# Patient Record
Sex: FEMALE | Race: WHITE | NOT HISPANIC OR LATINO | Employment: FULL TIME | URBAN - METROPOLITAN AREA
[De-identification: names, ages, dates, MRNs, and addresses within clinical notes are randomized per-mention and may not be internally consistent; named-entity substitution may affect disease eponyms.]

---

## 2017-04-05 ENCOUNTER — TRANSFERRED RECORDS (OUTPATIENT)
Dept: MULTI SPECIALTY CLINIC | Facility: CLINIC | Age: 47
End: 2017-04-05

## 2017-06-24 ENCOUNTER — HEALTH MAINTENANCE LETTER (OUTPATIENT)
Age: 47
End: 2017-06-24

## 2019-10-02 ENCOUNTER — HEALTH MAINTENANCE LETTER (OUTPATIENT)
Age: 49
End: 2019-10-02

## 2021-01-15 ENCOUNTER — HEALTH MAINTENANCE LETTER (OUTPATIENT)
Age: 51
End: 2021-01-15

## 2021-09-05 ENCOUNTER — HEALTH MAINTENANCE LETTER (OUTPATIENT)
Age: 51
End: 2021-09-05

## 2022-02-08 ENCOUNTER — OFFICE VISIT (OUTPATIENT)
Dept: FAMILY MEDICINE | Facility: CLINIC | Age: 52
End: 2022-02-08
Payer: COMMERCIAL

## 2022-02-08 VITALS
OXYGEN SATURATION: 96 % | HEART RATE: 91 BPM | SYSTOLIC BLOOD PRESSURE: 110 MMHG | TEMPERATURE: 98.4 F | WEIGHT: 127 LBS | DIASTOLIC BLOOD PRESSURE: 64 MMHG

## 2022-02-08 DIAGNOSIS — J32.9 SINUSITIS, UNSPECIFIED CHRONICITY, UNSPECIFIED LOCATION: Primary | ICD-10-CM

## 2022-02-08 PROCEDURE — 99203 OFFICE O/P NEW LOW 30 MIN: CPT | Performed by: NURSE PRACTITIONER

## 2022-02-08 PROCEDURE — 99000 SPECIMEN HANDLING OFFICE-LAB: CPT | Performed by: NURSE PRACTITIONER

## 2022-02-08 PROCEDURE — U0003 INFECTIOUS AGENT DETECTION BY NUCLEIC ACID (DNA OR RNA); SEVERE ACUTE RESPIRATORY SYNDROME CORONAVIRUS 2 (SARS-COV-2) (CORONAVIRUS DISEASE [COVID-19]), AMPLIFIED PROBE TECHNIQUE, MAKING USE OF HIGH THROUGHPUT TECHNOLOGIES AS DESCRIBED BY CMS-2020-01-R: HCPCS | Mod: 90 | Performed by: NURSE PRACTITIONER

## 2022-02-08 PROCEDURE — U0005 INFEC AGEN DETEC AMPLI PROBE: HCPCS | Mod: 90 | Performed by: NURSE PRACTITIONER

## 2022-02-08 RX ORDER — LEVOTHYROXINE SODIUM 100 UG/1
100 TABLET ORAL DAILY
COMMUNITY
End: 2022-11-16

## 2022-02-08 NOTE — PROGRESS NOTES
Assessment & Plan     Sinusitis, unspecified chronicity, unspecified location  Nasal congestion and sinus fullness for 10 days.  We will treat empirically with Augmentin.  She had a negative home rapid antigen test about 5 days ago but we will test her with PCR while she is here  - amoxicillin-clavulanate (AUGMENTIN) 875-125 MG tablet; Take 1 tablet by mouth 2 times daily  - Symptomatic; Unknown COVID-19 Virus (Coronavirus) by PCR Nose    Patient Instructions   Augmentin antibiotic 1 tablet twice daily for 7 days.    Covid testing today.      Return in about 3 months (around 5/8/2022) for Follow up.    Robbie Schreiber, [facility]    Justo Wilson is a 51 year old who presents for the following health issues     HPI     Sinus fullness along with some pressure.  Symptom started about 10 days ago.  No fevers.  No respiratory distress.  Mild cough.  She appears to be vaccinated for COVID-19      Review of Systems   Constitutional, HEENT, cardiovascular, pulmonary, gi and gu systems are negative, except as otherwise noted.      Objective    /64 (BP Location: Right arm, Patient Position: Sitting, Cuff Size: Adult Regular)   Pulse 91   Temp 98.4  F (36.9  C) (Oral)   Wt 57.6 kg (127 lb)   SpO2 96%   There is no height or weight on file to calculate BMI.  Physical Exam     Patient is alert oriented and in no acute distress.

## 2022-02-09 LAB — SARS-COV-2 RNA RESP QL NAA+PROBE: NOT DETECTED

## 2022-02-19 ENCOUNTER — HEALTH MAINTENANCE LETTER (OUTPATIENT)
Age: 52
End: 2022-02-19

## 2022-05-17 ENCOUNTER — NURSE TRIAGE (OUTPATIENT)
Dept: NURSING | Facility: CLINIC | Age: 52
End: 2022-05-17
Payer: COMMERCIAL

## 2022-05-17 NOTE — TELEPHONE ENCOUNTER
Patient calling with symptoms: sneezing, coughing, sore throat, sinus headache, body aches.     Symptoms started yesterday  No fever, no shortness of breath.    2 negative at-home covid test, but was exposed to Covid by child she greg on Friday and Saturday.    Patient does not have a PCP with [place]. Advised virtual visit and Covid PCR test. Patient declined at this time and will go to urgent care if symptoms worsen. Care advice given and advised patient call back with any questions or concerns. Patient verbalized understanding and agrees with plan.    Latanya Rea RN  05/17/22 3:59 PM  [facility] Nurse Advisor        Reason for Disposition    [1] COVID-19 infection suspected by caller or triager AND [2] mild symptoms (cough, fever, or others) AND [3] negative COVID-19 rapid test    Additional Information    Negative: SEVERE difficulty breathing (e.g., struggling for each breath, speaks in single words)    Negative: Difficult to awaken or acting confused (e.g., disoriented, slurred speech)    Negative: Bluish (or gray) lips or face now    Negative: Shock suspected (e.g., cold/pale/clammy skin, too weak to stand, low BP, rapid pulse)    Negative: Sounds like a life-threatening emergency to the triager    Negative: [1] Diagnosed or suspected COVID-19 AND [2] symptoms lasting 3 or more weeks    Negative: [1] COVID-19 exposure AND [2] no symptoms    Negative: COVID-19 vaccine reaction suspected (e.g., fever, headache, muscle aches) occurring 1 to 3 days after getting vaccine    Negative: COVID-19 vaccine, questions about    Negative: [1] Lives with someone known to have influenza (flu test positive) AND [2] flu-like symptoms (e.g., cough, runny nose, sore throat, SOB; with or without fever)    Negative: [1] Adult with possible COVID-19 symptoms AND [2] triager concerned about severity of symptoms or other causes    Negative: COVID-19 and breastfeeding, questions about    Negative: SEVERE or constant chest  pain or pressure  (Exception: Mild central chest pain, present only when coughing.)    Negative: Headache and stiff neck (can't touch chin to chest)    Negative: MODERATE difficulty breathing (e.g., speaks in phrases, SOB even at rest, pulse 100-120)    Negative: Oxygen level (e.g., pulse oximetry) 90 percent or lower    Negative: Chest pain or pressure    Negative: Patient sounds very sick or weak to the triager    Negative: MILD difficulty breathing (e.g., minimal/no SOB at rest, SOB with walking, pulse <100)    Negative: Fever > 103 F (39.4 C)    Negative: [1] Fever > 101 F (38.3 C) AND [2] over 60 years of age    Negative: [1] Fever > 100.0 F (37.8 C) AND [2] bedridden (e.g., nursing home patient, CVA, chronic illness, recovering from surgery)    Negative: HIGH RISK for severe COVID complications (e.g., weak immune system, age > 64 years, obesity with BMI > 25, pregnant, chronic lung disease or other chronic medical condition) (Exception: Already seen by PCP and no new or worsening symptoms.)    Negative: [1] HIGH RISK patient AND [2] influenza is widespread in the community AND [3] ONE OR MORE respiratory symptoms: cough, sore throat, runny or stuffy nose    Negative: [1] HIGH RISK patient AND [2] influenza exposure within the last 7 days AND [3] ONE OR MORE respiratory symptoms: cough, sore throat, runny or stuffy nose    Negative: Oxygen level (e.g., pulse oximetry) 91 to 94 percent    Protocols used: CORONAVIRUS (COVID-19) DIAGNOSED OR [id number] 1.18.2022    COVID 19 Nurse Triage Plan/Patient Instructions    Please be aware that novel coronavirus (COVID-19) may be circulating in the community. If you develop symptoms such as fever, cough, or SOB or if you have concerns about the presence of another infection including coronavirus (COVID-19), please contact your health care provider or visit [email or web address].     Disposition/Instructions    Virtual Visit with provider recommended. Reference  Visit Selection Guide.    Thank you for taking steps to prevent the spread of this virus.  o Limit your contact with others.  o Wear a simple mask to cover your cough.  o Wash your hands well and often.    Resources    [facility]: About COVID-19: [email or web address]    CDC: What to Do If You're Sick: www.cdc.gov/coronavirus/2019-ncov/about/steps-when-sick.html    CDC: Ending Home Isolation: www.cdc.gov/coronavirus/2019-ncov/hcp/disposition-in-home-patients.html     CDC: Caring for Someone: www.cdc.gov/coronavirus/2019-ncov/if-you-are-sick/care-for-someone.html     [facility]: Interim Guidance for Hospital Discharge to Home: [email or web address]/[email or web address]/hcp/hospdischarge.pdf    [facility] clinical trials (COVID-19 research studies): [email or web address].[facility]-clinical-trials     Below are the COVID-19 hotlines at the [place] Department of [facility] ([facility]). Interpreters are available.   o For health questions: Call 826-638-2628 or 1-320.795.5900 (7 a.m. to 7 p.m.)  o For questions about schools and childcare: Call 163-195-6488 or 1-991.287.5427 (7 a.m. to 7 p.m.)

## 2022-05-18 ENCOUNTER — VIRTUAL VISIT (OUTPATIENT)
Dept: FAMILY MEDICINE | Facility: CLINIC | Age: 52
End: 2022-05-18
Payer: COMMERCIAL

## 2022-05-18 ENCOUNTER — NURSE TRIAGE (OUTPATIENT)
Dept: NURSING | Facility: CLINIC | Age: 52
End: 2022-05-18

## 2022-05-18 ENCOUNTER — LAB (OUTPATIENT)
Dept: FAMILY MEDICINE | Facility: CLINIC | Age: 52
End: 2022-05-18
Attending: PHYSICIAN ASSISTANT

## 2022-05-18 DIAGNOSIS — J06.9 UPPER RESPIRATORY TRACT INFECTION, UNSPECIFIED TYPE: ICD-10-CM

## 2022-05-18 DIAGNOSIS — J06.9 UPPER RESPIRATORY TRACT INFECTION, UNSPECIFIED TYPE: Primary | ICD-10-CM

## 2022-05-18 LAB
FLUAV AG SPEC QL IA: NEGATIVE
FLUBV AG SPEC QL IA: NEGATIVE
SARS-COV-2 RNA RESP QL NAA+PROBE: POSITIVE

## 2022-05-18 PROCEDURE — U0005 INFEC AGEN DETEC AMPLI PROBE: HCPCS

## 2022-05-18 PROCEDURE — 99213 OFFICE O/P EST LOW 20 MIN: CPT | Mod: CS | Performed by: PHYSICIAN ASSISTANT

## 2022-05-18 PROCEDURE — 87804 INFLUENZA ASSAY W/OPTIC: CPT

## 2022-05-18 PROCEDURE — 87804 INFLUENZA ASSAY W/OPTIC: CPT | Mod: 59

## 2022-05-18 PROCEDURE — U0003 INFECTIOUS AGENT DETECTION BY NUCLEIC ACID (DNA OR RNA); SEVERE ACUTE RESPIRATORY SYNDROME CORONAVIRUS 2 (SARS-COV-2) (CORONAVIRUS DISEASE [COVID-19]), AMPLIFIED PROBE TECHNIQUE, MAKING USE OF HIGH THROUGHPUT TECHNOLOGIES AS DESCRIBED BY CMS-2020-01-R: HCPCS

## 2022-05-18 RX ORDER — PSEUDOEPHEDRINE HCL 120 MG/1
120 TABLET, FILM COATED, EXTENDED RELEASE ORAL EVERY 12 HOURS
Qty: 24 TABLET | Refills: 0 | Status: SHIPPED | OUTPATIENT
Start: 2022-05-18 | End: 2022-09-25

## 2022-05-18 RX ORDER — GUAIFENESIN AND DEXTROMETHORPHAN HYDROBROMIDE 1200; 60 MG/1; MG/1
1 TABLET, EXTENDED RELEASE ORAL 2 TIMES DAILY
Qty: 28 TABLET | Refills: 0 | Status: SHIPPED | OUTPATIENT
Start: 2022-05-18 | End: 2022-09-25

## 2022-05-18 NOTE — PATIENT INSTRUCTIONS
At [facility], we strive to deliver an exceptional experience to you, every time we see you. If you receive a survey, please complete it as we do value your feedback.  If you have MyChart, you can expect to receive results automatically within 24 hours of their completion.  Your provider will send a note interpreting your results as well.   If you do not have MyChart, you should receive your results in about a week by mail.    Your care team:                            Family Medicine Internal Medicine   MD Dawson Flores MD Shantel Branch-Fleming, MD Srinivasa Vaka, MD Katya Belousova, DANIELLA Viera CNP, MD ([facility]   Ricco Penn, MD Darleen Cowan MD Amelia Massimini APRN CNP Kim Thein, APRN CNP Bethany Templen, MD             Clinic hours: Monday - Thursday 7 am-6 pm; Fridays 7 am-5 pm.   Urgent care: Monday - Friday 10 am- 8 pm; Saturday and Sunday 9 am-5 pm.    Clinic: [phone]       [facility]: Monday - Thursday 8 am - 7 pm; Friday 8 am - 6 pm  [facility]: [phone]

## 2022-05-18 NOTE — PROGRESS NOTES
Katie is a 51 year old who is being evaluated via a billable phone visit.      How would you like to obtain your AVS? MyChart  If the video visit is dropped, the invitation should be resent by: Text to cell phone: [phone]  Will anyone else be joining your video visit? No          Assessment & Plan   Problem List Items Addressed This Visit    None     Visit Diagnoses     Upper respiratory tract infection, unspecified type    -  Primary    Relevant Medications    Dextromethorphan-Guaifenesin  MG TB12    pseudoePHEDrine (SUDAFED) 120 MG 12 hr tablet    Other Relevant Orders    Symptomatic; Yes; 5/16/2022 COVID-19 Virus (Coronavirus) by PCR    Influenza A/B antigen (Completed)       symptomatic care with Sudafed and Mucinex DM p  er order   Increase fluids  Patient has Albuterol inhaler from Covid infection in November, but she dies not feel the need to use it this time due to lack of chest pain or shortness of breath at this time.    Patient declined strep test  Follow up if symptom worsen in URGENT CARE or ED    10 minutes spent on the date of the encounter doing chart review, history and exam, documentation and further activities per the note           Return in about 5 days (around 5/23/2022), or if symptoms worsen or fail to improve.    Juanita Jones PA-C  [facility]   Katie is a 51 year old who presents for the following health issues     History of Present Illness       Reason for visit:  Covid exposure and now have symptoms of that our influenza  Symptom onset:  1-3 days ago  Symptoms include:  Sore throat, sinus headache first 2 days, now fatigue, body aches, cough, congestion and runny nose  Symptom intensity:  Moderate  Symptom progression:  Staying the same  Had these symptoms before:  No  What makes it better:  Rest    She eats 0-1 servings of fruits and vegetables daily.She consumes 2 sweetened beverage(s) daily.She exercises with enough  effort to increase her heart rate 30 to 60 minutes per day.  She exercises with enough effort to increase her heart rate 4 days per week. She is missing 2 dose(s) of medications per week.  She is not taking prescribed medications regularly due to remembering to take.     RESPIRATORY SYMPTOMS      Duration: 2 days, onset Monday 5/16/22    Description  nasal congestion, rhinorrhea, sore throat and cough    Severity: moderate    Accompanying signs and symptoms: None    History (predisposing factors):  covid exposure    Precipitating or alleviating factors:     Therapies tried and outcome:  none        Review of Systems   Constitutional, HEENT, cardiovascular, pulmonary, gi and gu systems are negative, except as otherwise noted.      Objective           Vitals:  No vitals were obtained today due to virtual visit.    Physical Exam   Physical Exam   healthy, alert and no distress  PSYCH: Alert and oriented times 3; coherent speech, normal   rate and volume, able to articulate logical thoughts, able   to abstract reason, no tangential thoughts, no hallucinations   or delusions  His affect is normal  RESP: No cough, no audible wheezing, able to talk in full sentences  Remainder of exam unable to be completed due to telephone visits                  Visit Details    Type of service: phone Visit duration 10 minutes

## 2022-05-19 NOTE — TELEPHONE ENCOUNTER
"Patient called.  She saw she is positive for covid.  She wanted to know the quarantine information.  Questions answered.      Coronavirus (COVID-19) Notification    Caller Name (Patient, parent, daughter/son, grandparent, etc)  Katie    Reason for call  Notify of Positive Coronavirus (COVID-19) lab results, assess symptoms,  review [facility] recommendations    Lab Result    Lab test:  2019-nCoV rRt-PCR or SARS-CoV-2 PCR    Oropharyngeal AND/OR nasopharyngeal swabs is POSITIVE for 2019-nCoV RNA/SARS-COV-2 PCR (COVID-19 virus)    Brief introduction script  Introduce self then review script:  \"I am calling on behalf of [employer].  We were notified that your Coronavirus test (COVID-19) for was POSITIVE for the virus.\"    Gather patient reported symptoms   Assessment   Current Symptoms at time of phone call, reported by patient: (if no symptoms, document No symptoms] Cough, stuffy nose, congestion   Date of Symptom(s) onset (if applicable) 5/16     If at time of call, Patients symptoms hare worsened, the Patient should contact 911 or have someone drive them to Emergency Dept promptly:      If Patient calling 911, inform 911 personal that you have tested positive for the Coronavirus (COVID-19).  Place mask on and await 911 to arrive.    If Emergency Dept, If possible, please have another adult drive you to the Emergency Dept but you need to wear mask when in contact with other people.      Monoclonal Antibody Administration    You may be eligible to receive a new treatment with a monoclonal antibody for preventing hospitalization in patients at high risk for complications from COVID-19. This medication is still experimental and available on a limited basis; it is given through an IV and must be given at an infusion center. Please note that not all people who are eligible will receive the medication since it is in limited supply.  Is the patient symptomatic and onset of symptoms within the last 7 days?  " Yes  Is the patient interested in a visit with a provider to discuss treatment options?: No.  Reason patient declined:  Other: Patient sebastian qualify has no underlying conditions didnt offer    Review information with Patient    Your result was positive. This means you have COVID-19 (coronavirus).      How can I protect others?    These guidelines are for isolating before returning to work, school or .       If you DO have symptoms:  o Stay home and away from others  - For at least 5 days after your symptoms started, AND   - You are fever free for 24 hours (with no medicine that reduces fever), AND  - Your other symptoms are better.  o Wear a mask for 10 full days any time you are around others.    If you DON'T have symptoms:  o Stay at home and away from others for at least 5 days after your positive test.  o Wear a mask for 10 full days any time you are around others.    There may be different guidelines for healthcare facilities. Please check with the specific sites before arriving.     If you've been told by a doctor that you were severely ill with COVID-19 or are immunocompromised, you should isolate for at least 10 days.    You should not go back to work until you meet the guidelines above for ending your home isolation. You don't need to be retested for COVID-19 before going back to work--studies show that you won't spread the virus if it's been at least 10 days since your symptoms started (or 20 days, if you have a weak immune system).    Employers, schools, and daycares: This is an official notice for this person's medical guidelines for returning in-person. They must meet the above guidelines before going back to work, school, or  in person.    You will receive a positive COVID-19 letter via [employer] or the mail soon with additional self-care information (exception, no letters sent to presurgical/preprocedure patients).     Would you like me to review some of that information with you now?   No    If you were tested for an upcoming procedure, please contact your provider for next steps.     Destiny Holder RN    Reason for Disposition    COVID-19 Home Isolation, questions about    Additional Information    Negative: SEVERE difficulty breathing (e.g., struggling for each breath, speaks in single words)    Negative: Difficult to awaken or acting confused (e.g., disoriented, slurred speech)    Negative: Bluish (or gray) lips or face now    Negative: Shock suspected (e.g., cold/pale/clammy skin, too weak to stand, low BP, rapid pulse)    Negative: Sounds like a life-threatening emergency to the triager    Negative: [1] Diagnosed or suspected COVID-19 AND [2] symptoms lasting 3 or more weeks    Negative: [1] COVID-19 exposure AND [2] no symptoms    Negative: COVID-19 vaccine reaction suspected (e.g., fever, headache, muscle aches) occurring 1 to 3 days after getting vaccine    Negative: COVID-19 vaccine, questions about    Negative: [1] Lives with someone known to have influenza (flu test positive) AND [2] flu-like symptoms (e.g., cough, runny nose, sore throat, SOB; with or without fever)    Negative: [1] Adult with possible COVID-19 symptoms AND [2] triager concerned about severity of symptoms or other causes    Negative: COVID-19 and breastfeeding, questions about    Negative: SEVERE or constant chest pain or pressure  (Exception: Mild central chest pain, present only when coughing.)    Negative: MODERATE difficulty breathing (e.g., speaks in phrases, SOB even at rest, pulse 100-120)    Negative: [1] Headache AND [2] stiff neck (can't touch chin to chest)    Negative: Oxygen level (e.g., pulse oximetry) 90 percent or lower    Negative: Chest pain or pressure    Negative: Patient sounds very sick or weak to the triager    Negative: MILD difficulty breathing (e.g., minimal/no SOB at rest, SOB with walking, pulse <100)    Negative: Fever > 103 F (39.4 C)    Negative: [1] Fever > 101 F (38.3 C) AND [2]  age > 60 years    Negative: [1] Fever > 100.0 F (37.8 C) AND [2] bedridden (e.g., nursing home patient, CVA, chronic illness, recovering from surgery)    Negative: HIGH RISK for severe COVID complications (e.g., weak immune system, age > 64 years, obesity with BMI > 25, pregnant, chronic lung disease or other chronic medical condition)  (Exception: Already seen by PCP and no new or worsening symptoms.)    Negative: [1] HIGH RISK patient AND [2] influenza is widespread in the community AND [3] ONE OR MORE respiratory symptoms: cough, sore throat, runny or stuffy nose    Negative: [1] HIGH RISK patient AND [2] influenza exposure within the last 7 days AND [3] ONE OR MORE respiratory symptoms: cough, sore throat, runny or stuffy nose    Negative: Oxygen level (e.g., pulse oximetry) 91 to 94 percent    Negative: Fever present > 3 days (72 hours)    Negative: [1] Fever returns after gone for over 24 hours AND [2] symptoms worse or not improved    Negative: [1] Continuous (nonstop) coughing interferes with work or school AND [2] no improvement using cough treatment per Care Advice    Negative: [1] COVID-19 infection suspected by caller or triager AND [2] mild symptoms (cough, fever, or others) AND [3] negative COVID-19 rapid test    Negative: Cough present > 3 weeks    Negative: [1] COVID-19 diagnosed by positive lab test (e.g., PCR, rapid self-test kit) AND [2] NO symptoms (e.g., cough, fever, others)    Negative: [1] COVID-19 diagnosed by positive lab test (e.g., PCR, rapid self-test kit) AND [2] mild symptoms (e.g., cough, fever, others) AND [3] no complications or SOB    Negative: [1] COVID-19 diagnosed by doctor (or NP/PA) AND [2] mild symptoms (e.g., cough, fever, others) AND [3] no complications or SOB    Negative: [1] COVID-19 diagnosed AND [2] has mild nausea, vomiting or diarrhea    Negative: [1] COVID-19 infection suspected by caller or triager AND [2] mild symptoms (cough, fever, or others) AND [3] has not  angel tested yet    Protocols used: CORONAVIRUS (COVID-19) DIAGNOSED OR [id number] 1.18.2022

## 2022-05-19 NOTE — RESULT ENCOUNTER NOTE
Please call the patient with these results:    Covid was positive. Does patient wish to start on antiviral medication for Covid? Today is the last day to start them, if she wishes to.  Please let me know   Juanita Jones PA-C

## 2022-05-21 ENCOUNTER — OFFICE VISIT (OUTPATIENT)
Dept: FAMILY MEDICINE | Facility: CLINIC | Age: 52
End: 2022-05-21
Payer: COMMERCIAL

## 2022-05-21 VITALS
DIASTOLIC BLOOD PRESSURE: 72 MMHG | TEMPERATURE: 98 F | SYSTOLIC BLOOD PRESSURE: 110 MMHG | OXYGEN SATURATION: 99 % | HEART RATE: 66 BPM | RESPIRATION RATE: 20 BRPM | WEIGHT: 126 LBS

## 2022-05-21 DIAGNOSIS — L85.3 XEROSIS CUTIS: ICD-10-CM

## 2022-05-21 DIAGNOSIS — B30.9 VIRAL CONJUNCTIVITIS OF BOTH EYES: Primary | ICD-10-CM

## 2022-05-21 PROCEDURE — 99213 OFFICE O/P EST LOW 20 MIN: CPT | Performed by: PHYSICIAN ASSISTANT

## 2022-05-21 RX ORDER — NEOMYCIN SULFATE, POLYMYXIN B SULFATE AND DEXAMETHASONE 3.5; 10000; 1 MG/ML; [USP'U]/ML; MG/ML
SUSPENSION/ DROPS OPHTHALMIC
COMMUNITY
Start: 2022-04-20 | End: 2022-09-25

## 2022-05-21 NOTE — PROGRESS NOTES
"  Assessment & Plan:      Problem List Items Addressed This Visit    None     Visit Diagnoses     Viral conjunctivitis of both eyes    -  Primary    Xerosis cutis        Relevant Medications    neomycin-polymyxin-dexamethasone (MAXITROL) 3.5-65670-3.1 SUSP ophthalmic susp        Medical Decision Making  Patient with recent diagnosis of COVID-19 symptom onset on 5 days ago presents with a red rash on the left side of the face.  Examination today shows bilateral viral conjunctivitis but no signs of bacterial etiology.  Rash on the left side of face appears consistent with dry skin likely a result of the frequent tearing of the left eye.  Recommend moisturizer, cold compresses, and a zinc oxide barrier cream as needed.  No signs concerning for shingles as there are no vesicles or pustules seen.  Discussed signs of worsening symptoms and when to follow-up with PCP if no symptom improvement.     Subjective:      Katie Carson is a 51 year old female here for evaluation of eye tearing and a rash along the left side of the face.  Patient was recently diagnosed with COVID-19 with symptoms starting 5 days ago.  She notes fatigue, mild cough, and rhinorrhea.  Patient denies shortness of breath.  She has had lots of clear tearing in the left eye worse than the right.  About 2 to 3 days ago she started to note a red rash just below and lateral to the left eye.  Rash feels like \"burning\".  She has been using cold compresses with some mild relief of symptoms.  She had a similar rash along the eye several years ago.  They initially thought it was shingles, but after following up with the eye doctor, shingles was ruled out.  Patient is taking steroid eyedrops prescribed by her eye doctor for history of eye inflammation and possible blocked tear duct.  Patient does note good relief of the current tearing and irritation after using the steroid eyedrops.     The following portions of the patient's history were reviewed and " updated as appropriate: allergies, current medications, and problem list.     Review of Systems  Pertinent items are noted in HPI.    Allergies  Allergies   Allergen Reactions     Tobramycin Other (See Comments)     Swelling and blistering of skin     Levofloxacin      Other reaction(s): Gastrointestinal       Family History   Problem Relation Age of Onset     Depression Mother      Osteoporosis Mother      Hypertension Father      Cerebrovascular Disease Father      Substance Abuse Brother        Social History     Tobacco Use     Smoking status: Never Smoker     Smokeless tobacco: Never Used   Substance Use Topics     Alcohol use: Not Currently        Objective:      /72   Pulse 66   Temp 98  F (36.7  C) (Oral)   Resp 20   Wt 57.2 kg (126 lb)   SpO2 99%   General appearance - alert, well appearing, and in no distress and non-toxic  Eyes - Conjunctivae/sclera appear injected, no purulent drainage seen  Skin - Face: Tissue along the lateral aspect of the left eye appears erythematous and dry, no other vesicles or pustules    The use of [employer] dictation services was used to construct the content of this note; any grammatical errors are non-intentional. Please contact the author directly if you are in need of any clarification.

## 2022-06-17 ENCOUNTER — TELEPHONE (OUTPATIENT)
Dept: FAMILY MEDICINE | Facility: CLINIC | Age: 52
End: 2022-06-17
Payer: COMMERCIAL

## 2022-06-17 NOTE — TELEPHONE ENCOUNTER
Patient Quality Outreach    Patient is due for the following:   Breast Cancer Screening - Mammogram  Cervical Cancer Screening - PAP Needed  Physical  - Preventive care visit  Immunizations  -  Covid and Zoster    NEXT STEPS:   myChart message and Letter send to Pt    Type of outreach:    Sent MyChart message. and Sent letter.    Next Steps:  Reach out within 90 days via MyChart and Letter.    Max number of attempts reached: Yes. Will try again in 90 days if patient still on fail list.    Questions for provider review:    None     Supriya Peterson MA  Chart routed to Care Team.

## 2022-06-17 NOTE — LETTER
[facility]  [address] [address] [facility] [address] [address]  [phone]  Dept: [phone]    June 17, 2022    Katie Carson  [facility]  [place] [place] [facility] [address]    Dear Katie Carson,     At [facility] we care about your health and are committed to providing quality patient care.    Which includes staying current on preventive cancer screenings.  You can increase your chances of finding and treating cancers through regular screenings.      Our records indicate you may be due for the following preventive screening(s):    Mammogram    Mammogram for breast cancer   Recommended every 1-2 years for women age 50 and older  Mammograms help detect breast cancer, which is the most common cancer among women in the [place].  You may need to start having mammograms earlier and more often if you have had breast cancer, breast problems, or a family history of breast cancer.     Cervical Cancer Screening    Pap smear is a screening test used to detect cervical cancer. It is recommended every three years for women 21 and older. The test should be done at least once every three years but women who are at greater risk for cervical cancer may need to have the test more often.  Immunizations  Annual Wellness Visit    To schedule an appointment or discuss this screening further, you may contact us by phone at the [facility] at [phone] or online through the patient [email or web address] @ [email or web address]    If you have had any of the screenings listed above at another facility, please call us so that we may update your chart.      Your partners in health,      Quality Committee at [facility]

## 2022-09-25 ENCOUNTER — OFFICE VISIT (OUTPATIENT)
Dept: FAMILY MEDICINE | Facility: CLINIC | Age: 52
End: 2022-09-25
Payer: COMMERCIAL

## 2022-09-25 VITALS
DIASTOLIC BLOOD PRESSURE: 65 MMHG | HEART RATE: 65 BPM | RESPIRATION RATE: 16 BRPM | OXYGEN SATURATION: 97 % | WEIGHT: 129 LBS | TEMPERATURE: 98 F | SYSTOLIC BLOOD PRESSURE: 98 MMHG

## 2022-09-25 DIAGNOSIS — H60.92 OTITIS EXTERNA OF LEFT EAR, UNSPECIFIED CHRONICITY, UNSPECIFIED TYPE: Primary | ICD-10-CM

## 2022-09-25 PROCEDURE — 99213 OFFICE O/P EST LOW 20 MIN: CPT | Performed by: FAMILY MEDICINE

## 2022-09-25 RX ORDER — CIPROFLOXACIN AND DEXAMETHASONE 3; 1 MG/ML; MG/ML
4 SUSPENSION/ DROPS AURICULAR (OTIC) 2 TIMES DAILY
Qty: 7.5 ML | Refills: 0 | Status: SHIPPED | OUTPATIENT
Start: 2022-09-25 | End: 2022-10-02

## 2022-09-25 NOTE — PROGRESS NOTES
Assessment:       Otitis externa of left ear, unspecified chronicity, unspecified type  - ciprofloxacin-dexamethasone (CIPRODEX) 0.3-0.1 % otic suspension  Dispense: 7.5 mL; Refill: 0         Plan:   Patient with acute otitis externa of the left ear.  Likely secondary to history of eczema in the ear which is gotten infected.  We will treat with Ciprodex otic drops.  Follow-up if symptoms getting worse or not improving in 3 days.    MEDICATIONS:   Orders Placed This Encounter   Medications     ciprofloxacin-dexamethasone (CIPRODEX) 0.3-0.1 % otic suspension     Sig: Place 4 drops Into the left ear 2 times daily for 7 days     Dispense:  7.5 mL     Refill:  0         Subjective:       52 year old female with a history of eczema in her left ear presents for evaluation with a 1 year history of intense itching and occasional swelling in the ear which is now gotten significantly more swollen, painful, and with some drainage over the past 3 days.  She denies any fevers or chills.  It hurts to lay on that side.  Right ear is been unaffected.  No other nasal congestion or cough.    There is no problem list on file for this patient.      Past Medical History:   Diagnosis Date     Thyroid disease 1996       Past Surgical History:   Procedure Laterality Date     ABDOMEN SURGERY  2001     HYSTERECTOMY RADICAL, SALPINGO-OOPHORECTOMY         Current Outpatient Medications   Medication     ciprofloxacin-dexamethasone (CIPRODEX) 0.3-0.1 % otic suspension     levothyroxine (SYNTHROID/LEVOTHROID) 100 MCG tablet     No current facility-administered medications for this visit.       Allergies   Allergen Reactions     Tobramycin Other (See Comments)     Swelling and blistering of skin     Levofloxacin      Other reaction(s): Gastrointestinal       Family History   Problem Relation Age of Onset     Depression Mother      Osteoporosis Mother      Hypertension Father      Cerebrovascular Disease Father      Substance Abuse Brother         Social History     Socioeconomic History     Marital status: Single     Spouse name: None     Number of children: None     Years of education: None     Highest education level: None   Tobacco Use     Smoking status: Never Smoker     Smokeless tobacco: Never Used   Substance and Sexual Activity     Alcohol use: Not Currently     Drug use: Never     Sexual activity: Not Currently     Partners: Male     Birth control/protection: None   Other Topics Concern     Parent/sibling w/ CABG, MI or angioplasty before 65F 55M? No         Review of Systems  Pertinent items are noted in HPI.      Objective:     BP 98/65   Pulse 65   Temp 98  F (36.7  C) (Oral)   Resp 16   Wt 58.5 kg (129 lb)   SpO2 97%      General appearance: alert, appears stated age and cooperative  Ears: Left ear canal is erythematous, edematous, with some serous appearing drainage present.  Your canal is patent and I am able to visualize the left tympanic membrane which appears normal.  It does appear that she has some eczematous changes in her ear canal.  Right ear appears normal with normal ear canal and tympanic membrane.  Throat: lips, mucosa, and tongue normal; teeth and gums normal  Neck: no adenopathy         This note has been dictated using voice recognition software. Any grammatical or context distortions are unintentional and inherent to the software

## 2022-10-04 ENCOUNTER — OFFICE VISIT (OUTPATIENT)
Dept: INTERNAL MEDICINE | Facility: CLINIC | Age: 52
End: 2022-10-04
Payer: COMMERCIAL

## 2022-10-04 VITALS
OXYGEN SATURATION: 98 % | WEIGHT: 129 LBS | DIASTOLIC BLOOD PRESSURE: 58 MMHG | TEMPERATURE: 97.8 F | HEART RATE: 71 BPM | SYSTOLIC BLOOD PRESSURE: 116 MMHG | HEIGHT: 62 IN | BODY MASS INDEX: 23.74 KG/M2

## 2022-10-04 DIAGNOSIS — J06.9 VIRAL URI WITH COUGH: Primary | ICD-10-CM

## 2022-10-04 PROCEDURE — 99213 OFFICE O/P EST LOW 20 MIN: CPT | Performed by: INTERNAL MEDICINE

## 2022-10-04 NOTE — PATIENT INSTRUCTIONS
My assessment is    Non-COVID-19 viral upper respiratory infection, symptom onset approximately 5 days ago, when she start developed cough, sore throat, nasal congestion    She ran a home COVID antigen test which was negative today October 4, 2022.    She recalls a history of having had COVID-19 infection twice already, in approximately May 2022 in November 2021.    She had a urgent care visit on September 25 for left-sided otitis externa, which I believe is unrelated.  On examination today October 4, 2022, temperature is normal, oxygen saturation 98%  both tympanic canals and tympanic membranes look clear  Oropharynx clear, no cervical adenopathy  Lungs clear  Heart regular rate and rhythm no murmur  Abdomen nontender  Tremors no edema    Ms. 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  Although none of them are exhibiting any symptoms, it is entirely possible she may have picked up a bug from one of them.    I recommended symptomatic measures, which would mean over-the-counter cough and cold remedies, such as NyQuil, DayQuil, etc.    If she were to have a persistent cough going more than 10 days, especially if he begins to produce greenish or yellow sputum, then she can send me a MyChart message, and we could start a course of antibiotics if that need arises.

## 2022-10-04 NOTE — PROGRESS NOTES
Office Visit - Follow Up   Katie Carson   52 year old female    Date of Visit: 10/4/2022    Chief Complaint   Patient presents with     Cough        -------------------------------------------------------------------------------------------------------------------------  Assessment and Plan    Symptom directed visit    My assessment is    Non-COVID-19 viral upper respiratory infection, symptom onset approximately 5 days ago, when she start developed cough, sore throat, nasal congestion    She ran a home COVID antigen test which was negative today October 4, 2022.    She recalls a history of having had COVID-19 infection twice already, in approximately May 2022 in November 2021.    She had a urgent care visit on September 25 for left-sided otitis externa, which I believe is unrelated.  On examination today October 4, 2022, temperature is normal, oxygen saturation 98%  both tympanic canals and tympanic membranes look clear  Oropharynx clear, no cervical adenopathy  Lungs clear  Heart regular rate and rhythm no murmur  Abdomen nontender  Tremors no edema    Ms. 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  Although none of them are exhibiting any symptoms, it is entirely possible she may have picked up a bug from one of them.    I recommended symptomatic measures, which would mean over-the-counter cough and cold remedies, such as NyQuil, DayQuil, etc.    If she were to have a persistent cough going more than 10 days, especially if he begins to produce greenish or yellow sputum, then she can send me a MyCBlogict message, and we could start a course of antibiotics if that need arises.             --------------------------------------------------------------------------------------------------------------------------  History of Present Illness  This 52 year old old     Cough    Reason for visit:  Ear  "infection last week, this week cough  Symptom onset:  3-7 days ago    She eats 2-3 servings of fruits and vegetables daily.She consumes 1 sweetened beverage(s) daily.She exercises with enough effort to increase her heart rate 10 to 19 minutes per day.  She exercises with enough effort to increase her heart rate 3 or less days per week. She is missing 2 dose(s) of medications per week.  She is not taking prescribed medications regularly due to remembering to take.    Wt Readings from Last 3 Encounters:   10/04/22 58.5 kg (129 lb)   09/25/22 58.5 kg (129 lb)   05/21/22 57.2 kg (126 lb)     BP Readings from Last 3 Encounters:   10/04/22 116/58   09/25/22 98/65   05/21/22 110/72         Review of Systems: A comprehensive review of systems was negative except as noted.  ---------------------------------------------------------------------------------------------------------------------------    Medications, Allergies, Social, and Problem List   Current Outpatient Medications   Medication Sig Dispense Refill     levothyroxine (SYNTHROID/LEVOTHROID) 100 MCG tablet Take 100 mcg by mouth daily       Allergies   Allergen Reactions     Tobramycin Other (See Comments)     Swelling and blistering of skin     Levofloxacin      Other reaction(s): Gastrointestinal     Social History     Tobacco Use     Smoking status: Never Smoker     Smokeless tobacco: Never Used   Substance Use Topics     Alcohol use: Not Currently     Drug use: Never     There is no problem list on file for this patient.       Reviewed, reconciled and updated       Physical Exam   General Appearance:       /58 (BP Location: Right arm, Patient Position: Sitting, Cuff Size: Adult Regular)   Pulse 71   Temp 97.8  F (36.6  C) (Oral)   Ht 1.575 m (5' 2\")   Wt 58.5 kg (129 lb)   SpO2 98%   BMI 23.59 kg/m      On examination today October 4, 2022, temperature is normal, oxygen saturation 98%  both tympanic canals and tympanic membranes look " clear  Oropharynx clear, no cervical adenopathy  Lungs clear  Heart regular rate and rhythm no murmur  Abdomen nontender  Tremors no edema     Additional Information        AIDEN KISER MD, MD

## 2022-10-09 ENCOUNTER — E-VISIT (OUTPATIENT)
Dept: INTERNAL MEDICINE | Facility: CLINIC | Age: 52
End: 2022-10-09
Payer: COMMERCIAL

## 2022-10-09 DIAGNOSIS — J20.9 ACUTE BRONCHITIS, UNSPECIFIED ORGANISM: Primary | ICD-10-CM

## 2022-10-09 DIAGNOSIS — J20.9 ACUTE BRONCHITIS WITH SYMPTOMS > 10 DAYS: ICD-10-CM

## 2022-10-09 PROCEDURE — 99421 OL DIG E/M SVC 5-10 MIN: CPT | Performed by: INTERNAL MEDICINE

## 2022-10-10 RX ORDER — AZITHROMYCIN 250 MG/1
TABLET, FILM COATED ORAL
Qty: 6 TABLET | Refills: 0 | Status: SHIPPED | OUTPATIENT
Start: 2022-10-10 | End: 2022-10-15

## 2022-10-10 NOTE — PATIENT INSTRUCTIONS
"    Dear Katie Carson    After reviewing your responses, I've been able to diagnose you with \"Bronchitis\" which is a common infection of your lungs. While this is most commonly caused by a virus, the symptoms you have given suggest you should be treated with antibiotics.     I have sent azithromycin to your pharmacy to treat this infection.     It is important that you take all of your prescribed medication even if your symptoms are improving after a few doses. Taking all of your medicine helps prevent the symptoms from returning.     If your symptoms worsen, you develop chest pain or shortness of breath, fevers over 101, or are not improving in 5 days, please contact your primary care provider for an appointment or visit any of our convenient Walk-in Care or Urgent Care Centers to be seen which can be found on our website here.    Thanks again for choosing us as your health care partner,    Johnny Jo MD    "

## 2022-10-22 ENCOUNTER — HEALTH MAINTENANCE LETTER (OUTPATIENT)
Age: 52
End: 2022-10-22

## 2022-10-27 ENCOUNTER — MYC MEDICAL ADVICE (OUTPATIENT)
Dept: INTERNAL MEDICINE | Facility: CLINIC | Age: 52
End: 2022-10-27

## 2022-10-27 DIAGNOSIS — R05.8 NOCTURNAL COUGH WITH WHEEZE: Primary | ICD-10-CM

## 2022-10-27 DIAGNOSIS — R06.2 NOCTURNAL COUGH WITH WHEEZE: Primary | ICD-10-CM

## 2022-10-28 RX ORDER — ALBUTEROL SULFATE 90 UG/1
2 AEROSOL, METERED RESPIRATORY (INHALATION) EVERY 6 HOURS PRN
Qty: 18 G | Refills: 1 | Status: SHIPPED | OUTPATIENT
Start: 2022-10-28 | End: 2023-06-12

## 2022-10-28 NOTE — TELEPHONE ENCOUNTER
Dr Gomez,  Please see MyChart message from patient needing provider direction.    Please respond directly to patient if appropriate.    ALICIA SolomonN, RN  [facility]

## 2022-11-14 NOTE — PROGRESS NOTES
Assessment/Plan:    There are no diagnoses linked to this encounter.       Follow up: ***    Luisa Smith MD  [facility]    Subjective:   Katie Carson is a 52 year old female is here today for ***    -***      There is no problem list on file for this patient.    Past Medical History:   Diagnosis Date     Thyroid disease 1996     Past Surgical History:   Procedure Laterality Date     ABDOMEN SURGERY  2001     HYSTERECTOMY RADICAL, SALPINGO-OOPHORECTOMY       Current Outpatient Medications   Medication     albuterol (PROAIR HFA/PROVENTIL HFA/VENTOLIN HFA) 108 (90 Base) MCG/ACT inhaler     levothyroxine (SYNTHROID/LEVOTHROID) 100 MCG tablet     No current facility-administered medications for this visit.     Allergies   Allergen Reactions     Tobramycin Other (See Comments)     Swelling and blistering of skin     Levofloxacin      Other reaction(s): Gastrointestinal     Social History     Socioeconomic History     Marital status: Single     Spouse name: Not on file     Number of children: Not on file     Years of education: Not on file     Highest education level: Not on file   Occupational History     Not on file   Tobacco Use     Smoking status: Never     Smokeless tobacco: Never   Substance and Sexual Activity     Alcohol use: Not Currently     Drug use: Never     Sexual activity: Not Currently     Partners: Male     Birth control/protection: None   Other Topics Concern     Parent/sibling w/ CABG, MI or angioplasty before 65F 55M? No   Social History Narrative     Not on file     Social Determinants of Health     Financial Resource Strain: Not on file   Food Insecurity: Not on file   Transportation Needs: Not on file   Physical Activity: Not on file   Stress: Not on file   Social Connections: Not on file   Intimate Partner Violence: Not on file   Housing Stability: Not on file     Family History   Problem Relation Age of Onset     Depression Mother      Osteoporosis Mother       Hypertension Father      Cerebrovascular Disease Father      Substance Abuse Brother      Review of systems is as stated in HPI, and the remainder of system review is otherwise negative.    Objective:     There were no vitals taken for this visit.    General appearance: awake, NAD  HEENT: atraumatic, normocephalic, PERRL, no scleral icterus or injection, ears and nose grossly normal, moist mucous membranes  Neck: supple, no lymphadenopathy, normal ROM  CV: RRR, no murmurs/rubs/gallops, normal S1 and S2  Lungs: CTAB, no wheezes or crackles, breathing comfortably on room air  Abd: active bowel sounds, soft, non-distended, non-tender  Back: no CVA tenderness  Extremities: no LE edema bilaterally, moving all extremities, strong peripheral pulses bilaterally  Skin: no rashes or lesions  Neuro: alert, oriented x3, CNs grossly intact, no focal deficits appreciated, normal tone/strength/ROM/sensation, gait not tested  Psych: normal mood/affect/behavior, answering questions appropriately, linear thought process  ***

## 2022-11-15 ENCOUNTER — OFFICE VISIT (OUTPATIENT)
Dept: FAMILY MEDICINE | Facility: CLINIC | Age: 52
End: 2022-11-15
Payer: COMMERCIAL

## 2022-11-15 ENCOUNTER — TELEPHONE (OUTPATIENT)
Dept: LAB | Facility: CLINIC | Age: 52
End: 2022-11-15

## 2022-11-15 ENCOUNTER — ANCILLARY PROCEDURE (OUTPATIENT)
Dept: GENERAL RADIOLOGY | Facility: CLINIC | Age: 52
End: 2022-11-15
Attending: FAMILY MEDICINE
Payer: COMMERCIAL

## 2022-11-15 VITALS
HEART RATE: 65 BPM | DIASTOLIC BLOOD PRESSURE: 60 MMHG | OXYGEN SATURATION: 98 % | WEIGHT: 129.5 LBS | BODY MASS INDEX: 23.69 KG/M2 | TEMPERATURE: 97.2 F | SYSTOLIC BLOOD PRESSURE: 100 MMHG

## 2022-11-15 DIAGNOSIS — Z11.59 NEED FOR HEPATITIS C SCREENING TEST: ICD-10-CM

## 2022-11-15 DIAGNOSIS — M79.89 TOE SWELLING: ICD-10-CM

## 2022-11-15 DIAGNOSIS — Z78.9 HEPATITIS B VACCINATION STATUS UNKNOWN: ICD-10-CM

## 2022-11-15 DIAGNOSIS — M79.674 PAIN OF TOE OF RIGHT FOOT: ICD-10-CM

## 2022-11-15 DIAGNOSIS — Z11.4 SCREENING FOR HIV (HUMAN IMMUNODEFICIENCY VIRUS): ICD-10-CM

## 2022-11-15 DIAGNOSIS — E03.9 HYPOTHYROIDISM, UNSPECIFIED TYPE: Primary | ICD-10-CM

## 2022-11-15 DIAGNOSIS — Z13.220 SCREENING FOR HYPERLIPIDEMIA: ICD-10-CM

## 2022-11-15 DIAGNOSIS — Z13.1 DIABETES MELLITUS SCREENING: ICD-10-CM

## 2022-11-15 DIAGNOSIS — Z12.31 VISIT FOR SCREENING MAMMOGRAM: ICD-10-CM

## 2022-11-15 DIAGNOSIS — Z23 ENCOUNTER FOR VACCINATION: ICD-10-CM

## 2022-11-15 PROBLEM — N80.9 ENDOMETRIOSIS: Status: ACTIVE | Noted: 2017-03-22

## 2022-11-15 LAB
ANION GAP SERPL CALCULATED.3IONS-SCNC: 18 MMOL/L (ref 7–15)
BUN SERPL-MCNC: 20.5 MG/DL (ref 6–20)
CALCIUM SERPL-MCNC: 9.6 MG/DL (ref 8.6–10)
CHLORIDE SERPL-SCNC: 101 MMOL/L (ref 98–107)
CHOLEST SERPL-MCNC: 224 MG/DL
CREAT SERPL-MCNC: 0.82 MG/DL (ref 0.51–0.95)
DEPRECATED HCO3 PLAS-SCNC: 22 MMOL/L (ref 22–29)
ERYTHROCYTE [DISTWIDTH] IN BLOOD BY AUTOMATED COUNT: NORMAL %
GFR SERPL CREATININE-BSD FRML MDRD: 86 ML/MIN/1.73M2
GLUCOSE SERPL-MCNC: 99 MG/DL (ref 70–99)
HBA1C MFR BLD: 5.5 % (ref 0–5.6)
HCT VFR BLD AUTO: NORMAL %
HDLC SERPL-MCNC: 75 MG/DL
HGB BLD-MCNC: NORMAL G/DL
LDLC SERPL CALC-MCNC: 133 MG/DL
MCH RBC QN AUTO: NORMAL PG
MCHC RBC AUTO-ENTMCNC: NORMAL G/DL
MCV RBC AUTO: NORMAL FL
NONHDLC SERPL-MCNC: 149 MG/DL
PLATELET # BLD AUTO: NORMAL 10*3/UL
POTASSIUM SERPL-SCNC: 4.4 MMOL/L (ref 3.4–5.3)
RBC # BLD AUTO: NORMAL 10*6/UL
SODIUM SERPL-SCNC: 141 MMOL/L (ref 136–145)
T4 FREE SERPL-MCNC: 1.04 NG/DL (ref 0.9–1.7)
TRIGL SERPL-MCNC: 82 MG/DL
TSH SERPL DL<=0.005 MIU/L-ACNC: 6.19 UIU/ML (ref 0.3–4.2)
URATE SERPL-MCNC: 5.6 MG/DL (ref 2.4–5.7)
WBC # BLD AUTO: NORMAL 10*3/UL

## 2022-11-15 PROCEDURE — 80061 LIPID PANEL: CPT | Performed by: FAMILY MEDICINE

## 2022-11-15 PROCEDURE — 83036 HEMOGLOBIN GLYCOSYLATED A1C: CPT | Performed by: FAMILY MEDICINE

## 2022-11-15 PROCEDURE — 84443 ASSAY THYROID STIM HORMONE: CPT | Performed by: FAMILY MEDICINE

## 2022-11-15 PROCEDURE — 80048 BASIC METABOLIC PNL TOTAL CA: CPT | Performed by: FAMILY MEDICINE

## 2022-11-15 PROCEDURE — 84550 ASSAY OF BLOOD/URIC ACID: CPT | Performed by: FAMILY MEDICINE

## 2022-11-15 PROCEDURE — 86803 HEPATITIS C AB TEST: CPT | Performed by: FAMILY MEDICINE

## 2022-11-15 PROCEDURE — 86706 HEP B SURFACE ANTIBODY: CPT | Performed by: FAMILY MEDICINE

## 2022-11-15 PROCEDURE — 73660 X-RAY EXAM OF TOE(S): CPT | Mod: TC | Performed by: RADIOLOGY

## 2022-11-15 PROCEDURE — 99214 OFFICE O/P EST MOD 30 MIN: CPT | Performed by: FAMILY MEDICINE

## 2022-11-15 PROCEDURE — 36415 COLL VENOUS BLD VENIPUNCTURE: CPT | Performed by: FAMILY MEDICINE

## 2022-11-15 PROCEDURE — 84439 ASSAY OF FREE THYROXINE: CPT | Performed by: FAMILY MEDICINE

## 2022-11-15 PROCEDURE — 87389 HIV-1 AG W/HIV-1&-2 AB AG IA: CPT | Performed by: FAMILY MEDICINE

## 2022-11-15 ASSESSMENT — PAIN SCALES - GENERAL: PAINLEVEL: MODERATE PAIN (4)

## 2022-11-15 NOTE — PROGRESS NOTES
Assessment/Plan:    Hypothyroidism, unspecified type  Patient reports history of hypothyroidism, previously prescribed Synthroid but no longer taking, will check level today to determine if patient needs to restart this.  - TSH with free T4 reflex  - Basic metabolic panel  (Ca, Cl, CO2, Creat, Gluc, K, Na, BUN)  - CBC with platelets    Pain of toe of right foot  Toe swelling  Patient reports pain and swelling of right great toe; history of minor injury (goat stepped on toe), does seem to be better in the morning and worse throughout the day after she has been on it more.  Will evaluate further with x-ray and uric acid level.  If these tests are normal then could consider sprain versus evaluation for other rheumatologic conditions.  - Uric acid  - XR Toe Right G/E 2 Views    Visit for screening mammogram  Due for mammogram, ordered today.  - MA SCREENING DIGITAL BILAT - Future  (s+30)    Diabetes mellitus screening  Given age, will screen for diabetes today.  - Hemoglobin A1c    Screening for hyperlipidemia  Given age, will check lipid panel today.  - Lipid panel reflex to direct LDL Non-fasting    Hepatitis B vaccination status unknown  Hepatitis B status unknown, will check titer today.  - Hepatitis B Surface Antibody    Screening for HIV (human immunodeficiency virus)  Due for one-time HIV screen.  - HIV Antigen Antibody Combo    Need for hepatitis C screening test  Due for one-time hepatitis C screen.  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 : need new dr to get medication refill and possible injured toe/foot  How many servings of fruits and vegetables do you eat daily?: 2-3  On average, how many sweetened beverages do you drink each day (Examples: soda, juice, sweet tea, etc.  Do NOT count diet or artificially sweetened beverages)?: 0  How many minutes a day do you exercise enough to make your heart beat faster?: 10 to 19  How many days a week do you exercise enough to make your heart beat faster?: 3 or less  How many days per week do you miss taking your medication?: 7    Patient Active Problem List   Diagnosis     Hypothyroidism     Endometriosis     Bladder polyps     Anxiety and depression     Past Medical History:   Diagnosis Date     Thyroid disease 1996     Past Surgical History:   Procedure Laterality Date     ABDOMEN SURGERY  2001     HYSTERECTOMY RADICAL, SALPINGO-OOPHORECTOMY       Current Outpatient Medications   Medication     albuterol (PROAIR HFA/PROVENTIL HFA/VENTOLIN HFA) 108 (90 Base) MCG/ACT inhaler     levothyroxine (SYNTHROID/LEVOTHROID) 100 MCG tablet     No current facility-administered medications for this visit.     Allergies   Allergen Reactions     Tobramycin Other (See Comments)     Swelling and blistering of skin     Levofloxacin      Other reaction(s): Gastrointestinal     Sulfa Drugs Nausea and Vomiting     Social History     Socioeconomic History     Marital status: Single     Spouse name: Not on file     Number of children: Not on file     Years of education: Not on file     Highest education level: Not on file   Occupational History     Not on file   Tobacco Use     Smoking status: Never     Smokeless tobacco: Never   Substance and Sexual  Activity     Alcohol use: Not Currently     Drug use: Never     Sexual activity: Not Currently     Partners: Male     Birth control/protection: None   Other Topics Concern     Parent/sibling w/ CABG, MI or angioplasty before 65F 55M? No   Social History Narrative     Not on file     Social Determinants of Health     Financial Resource Strain: Not on file   Food Insecurity: Not on file   Transportation Needs: Not on file   Physical Activity: Not on file   Stress: Not on file   Social Connections: Not on file   Intimate Partner Violence: Not on file   Housing Stability: Not on file     Family History   Problem Relation Age of Onset     Depression Mother      Osteoporosis Mother      Rheumatic fever Mother      Atrial fibrillation Mother      Hypertension Father      Cerebrovascular Disease Father      Substance Abuse Brother      No Known Problems Brother      No Known Problems Brother      Gout Maternal Uncle      Review of systems is as stated in HPI, and the remainder of system review is otherwise negative.    Objective:     /60   Pulse 65   Temp 97.2  F (36.2  C)   Wt 58.7 kg (129 lb 8 oz)   SpO2 98%   BMI 23.69 kg/m      General appearance: awake, NAD  HEENT: atraumatic, normocephalic, no scleral icterus or injection  CV: RRR, no murmurs/rubs/gallops, normal S1 and S2  Lungs: CTAB, no wheezes or crackles, breathing comfortably on room air  Right foot: Overall normal in appearance, no bruising or skin changes, normal pulse, mild tenderness with palpation of metatarsal joint, no appreciable swelling at this time, great toe range of motion somewhat limited  Neuro: alert, oriented x3, CNs grossly intact, no focal deficits appreciated  Psych: normal mood/affect/behavior, answering questions appropriately, linear thought process

## 2022-11-16 ENCOUNTER — MYC MEDICAL ADVICE (OUTPATIENT)
Dept: FAMILY MEDICINE | Facility: CLINIC | Age: 52
End: 2022-11-16

## 2022-11-16 DIAGNOSIS — N80.9 ENDOMETRIOSIS: Primary | ICD-10-CM

## 2022-11-16 LAB
HBV SURFACE AB SERPL IA-ACNC: 0 M[IU]/ML
HBV SURFACE AB SERPL IA-ACNC: NONREACTIVE M[IU]/ML
HCV AB SERPL QL IA: NONREACTIVE
HIV 1+2 AB+HIV1 P24 AG SERPL QL IA: NONREACTIVE

## 2023-01-30 ENCOUNTER — MYC MEDICAL ADVICE (OUTPATIENT)
Dept: FAMILY MEDICINE | Facility: CLINIC | Age: 53
End: 2023-01-30
Payer: COMMERCIAL

## 2023-01-30 NOTE — TELEPHONE ENCOUNTER
C/o hunger, nausea, hot and h/a. Not on any medications to effect blood sugars. TSH with free T4 reflex 6.19 in November 2022. Not on thyroid medication currently.     Routed to Dr. Smith and covering provider. Berkley Bacon RN on 1/30/2023 at 12:05 PM

## 2023-01-31 ENCOUNTER — OFFICE VISIT (OUTPATIENT)
Dept: FAMILY MEDICINE | Facility: CLINIC | Age: 53
End: 2023-01-31
Payer: COMMERCIAL

## 2023-01-31 VITALS
BODY MASS INDEX: 24.11 KG/M2 | DIASTOLIC BLOOD PRESSURE: 68 MMHG | SYSTOLIC BLOOD PRESSURE: 102 MMHG | OXYGEN SATURATION: 98 % | WEIGHT: 131 LBS | HEIGHT: 62 IN | HEART RATE: 65 BPM | RESPIRATION RATE: 16 BRPM

## 2023-01-31 DIAGNOSIS — R11.0 NAUSEA: ICD-10-CM

## 2023-01-31 DIAGNOSIS — R10.13 ABDOMINAL PAIN, EPIGASTRIC: Primary | ICD-10-CM

## 2023-01-31 LAB
ALBUMIN SERPL BCG-MCNC: 4.5 G/DL (ref 3.5–5.2)
ALBUMIN UR-MCNC: NEGATIVE MG/DL
ALP SERPL-CCNC: 120 U/L (ref 35–104)
ALT SERPL W P-5'-P-CCNC: 28 U/L (ref 10–35)
ANION GAP SERPL CALCULATED.3IONS-SCNC: 12 MMOL/L (ref 7–15)
APPEARANCE UR: CLEAR
AST SERPL W P-5'-P-CCNC: 29 U/L (ref 10–35)
BILIRUB SERPL-MCNC: 0.2 MG/DL
BILIRUB UR QL STRIP: NEGATIVE
BUN SERPL-MCNC: 14.8 MG/DL (ref 6–20)
CALCIUM SERPL-MCNC: 9.9 MG/DL (ref 8.6–10)
CHLORIDE SERPL-SCNC: 103 MMOL/L (ref 98–107)
COLOR UR AUTO: YELLOW
CREAT SERPL-MCNC: 0.81 MG/DL (ref 0.51–0.95)
DEPRECATED HCO3 PLAS-SCNC: 26 MMOL/L (ref 22–29)
ERYTHROCYTE [DISTWIDTH] IN BLOOD BY AUTOMATED COUNT: 12.5 % (ref 10–15)
GFR SERPL CREATININE-BSD FRML MDRD: 87 ML/MIN/1.73M2
GLUCOSE SERPL-MCNC: 99 MG/DL (ref 70–99)
GLUCOSE UR STRIP-MCNC: NEGATIVE MG/DL
HCT VFR BLD AUTO: 36.9 % (ref 35–47)
HGB BLD-MCNC: 12.4 G/DL (ref 11.7–15.7)
HGB UR QL STRIP: ABNORMAL
KETONES UR STRIP-MCNC: NEGATIVE MG/DL
LEUKOCYTE ESTERASE UR QL STRIP: NEGATIVE
MCH RBC QN AUTO: 31.2 PG (ref 26.5–33)
MCHC RBC AUTO-ENTMCNC: 33.6 G/DL (ref 31.5–36.5)
MCV RBC AUTO: 93 FL (ref 78–100)
NITRATE UR QL: NEGATIVE
PH UR STRIP: 7 [PH] (ref 5–8)
PLATELET # BLD AUTO: 295 10E3/UL (ref 150–450)
POTASSIUM SERPL-SCNC: 4.7 MMOL/L (ref 3.4–5.3)
PROT SERPL-MCNC: 7.4 G/DL (ref 6.4–8.3)
RBC # BLD AUTO: 3.97 10E6/UL (ref 3.8–5.2)
RBC #/AREA URNS AUTO: ABNORMAL /HPF
SODIUM SERPL-SCNC: 141 MMOL/L (ref 136–145)
SP GR UR STRIP: 1.02 (ref 1–1.03)
T4 FREE SERPL-MCNC: 0.96 NG/DL (ref 0.9–1.7)
TSH SERPL DL<=0.005 MIU/L-ACNC: 15.7 UIU/ML (ref 0.3–4.2)
UROBILINOGEN UR STRIP-ACNC: 0.2 E.U./DL
WBC # BLD AUTO: 8 10E3/UL (ref 4–11)
WBC #/AREA URNS AUTO: ABNORMAL /HPF

## 2023-01-31 PROCEDURE — 99214 OFFICE O/P EST MOD 30 MIN: CPT | Performed by: PHYSICIAN ASSISTANT

## 2023-01-31 PROCEDURE — 80053 COMPREHEN METABOLIC PANEL: CPT | Performed by: PHYSICIAN ASSISTANT

## 2023-01-31 PROCEDURE — 36415 COLL VENOUS BLD VENIPUNCTURE: CPT | Performed by: PHYSICIAN ASSISTANT

## 2023-01-31 PROCEDURE — 81001 URINALYSIS AUTO W/SCOPE: CPT | Performed by: PHYSICIAN ASSISTANT

## 2023-01-31 PROCEDURE — 84439 ASSAY OF FREE THYROXINE: CPT | Performed by: PHYSICIAN ASSISTANT

## 2023-01-31 PROCEDURE — 85027 COMPLETE CBC AUTOMATED: CPT | Performed by: PHYSICIAN ASSISTANT

## 2023-01-31 PROCEDURE — 84443 ASSAY THYROID STIM HORMONE: CPT | Performed by: PHYSICIAN ASSISTANT

## 2023-01-31 PROCEDURE — 87338 HPYLORI STOOL AG IA: CPT | Performed by: PHYSICIAN ASSISTANT

## 2023-01-31 RX ORDER — PANTOPRAZOLE SODIUM 40 MG/1
40 TABLET, DELAYED RELEASE ORAL DAILY
Qty: 30 TABLET | Refills: 0 | Status: SHIPPED | OUTPATIENT
Start: 2023-01-31 | End: 2024-07-09

## 2023-01-31 NOTE — PROGRESS NOTES
Assessment & Plan     1. Abdominal pain, epigastric    - TSH with free T4 reflex; Future  - CBC with platelets; Future  - Comprehensive metabolic panel (BMP + Alb, Alk Phos, ALT, AST, Total. Bili, TP); Future  - UA macro with reflex to Microscopic and Culture - Clinc Collect  - Helicobacter pylori Antigen Stool; Future  - omeprazole (PRILOSEC) 20 MG DR capsule; Take 1 capsule (20 mg) by mouth daily  Dispense: 30 capsule; Refill: 0  - Helicobacter pylori Antigen Stool  - TSH with free T4 reflex  - CBC with platelets  - Comprehensive metabolic panel (BMP + Alb, Alk Phos, ALT, AST, Total. Bili, TP)  - pantoprazole (PROTONIX) 40 MG EC tablet; Take 1 tablet (40 mg) by mouth daily  Dispense: 30 tablet; Refill: 0    2. Nausea               MEDICATIONS:  Continue current medications without change, will add omeprazole daily    Await lab results, consider imaging if needed.     Return in about 2 weeks (around 2/14/2023) for Follow up.    Tiki River PA-C  [facility]   Katie is a 52 year old, presenting for the following health issues:  Symptoms (Extreme hunger, nausea, sweating at onset, headache isn't as prevalent as other symptoms. Does have a sore spot on abdomen that she has noticed, the symptoms are sudden and come on randomly through out the day.)      History of Present Illness       Reason for visit:  Sudden onset of extreme hunger, nausea, sometimes a headache and sweating accompany the onset  Symptom onset:  More than a month  Symptom intensity:  Severe  Symptom progression:  Worsening  Had these symptoms before:  Yes  Has tried/received treatment for these symptoms:  No  What makes it better:  Eating temporarily helps    She eats 0-1 servings of fruits and vegetables daily.She consumes 0 sweetened beverage(s) daily.She exercises with enough effort to increase her heart rate 20 to 29 minutes per day.  She exercises with enough effort to increase her heart rate 5  "days per week.   She is taking medications regularly.     Pt notes abdominal pain and nausea x 3 months along with feeling of severe hunger, began intermittently and is now occurring daily.  Relieved by food. Occurs more often in the morning.  Denies diarrhea or constipation. Has tried eating more frequently which doesn't seem to make a difference.  Has not tried any otc medications.  Denies fevers, chills body aches, night sweats.  Has h/o laparoscopy due to endometriosis and hysterectomy and bowel resection due to endometriosis, no other surgeries.  Works as a [employer] and for a [employer].            Review of Systems         Objective    /68 (BP Location: Left arm, Patient Position: Sitting, Cuff Size: Adult Regular)   Pulse 65   Resp 16   Ht 1.575 m (5' 2\")   Wt 59.4 kg (131 lb)   SpO2 98%   BMI 23.96 kg/m    Body mass index is 23.96 kg/m .  Physical Exam   GENERAL: healthy, alert and no distress  NECK: no adenopathy, no asymmetry, masses, or scars and thyroid normal to palpation  RESP: lungs clear to auscultation - no rales, rhonchi or wheezes  CV: regular rate and rhythm, normal S1 S2,  or rub, no peripheral edema and peripheral pulses strong  ABDOMEN: soft, mild ttp right low abdomen and epigastric area, no rebound or guarding, no hepatosplenomegaly, no masses and bowel sounds normal  MS: no gross musculoskeletal defects noted, no edema    Results for orders placed or performed in visit on 01/31/23 (from the past 24 hour(s))   CBC with platelets   Result Value Ref Range    WBC Count 8.0 4.0 - 11.0 10e3/uL    RBC Count 3.97 3.80 - 5.20 10e6/uL    Hemoglobin 12.4 11.7 - 15.7 g/dL    Hematocrit 36.9 35.0 - 47.0 %    MCV 93 78 - 100 fL    MCH 31.2 26.5 - 33.0 pg    MCHC 33.6 31.5 - 36.5 g/dL    RDW 12.5 10.0 - 15.0 %    Platelet Count 295 150 - 450 10e3/uL                   "

## 2023-02-01 ENCOUNTER — APPOINTMENT (OUTPATIENT)
Dept: LAB | Facility: CLINIC | Age: 53
End: 2023-02-01
Payer: COMMERCIAL

## 2023-02-02 LAB — H PYLORI AG STL QL IA: NEGATIVE

## 2023-02-03 ENCOUNTER — HOSPITAL ENCOUNTER (OUTPATIENT)
Dept: CT IMAGING | Facility: HOSPITAL | Age: 53
Discharge: HOME | End: 2023-02-03
Attending: PHYSICIAN ASSISTANT | Admitting: PHYSICIAN ASSISTANT
Payer: COMMERCIAL

## 2023-02-03 ENCOUNTER — TELEPHONE (OUTPATIENT)
Dept: FAMILY MEDICINE | Facility: CLINIC | Age: 53
End: 2023-02-03
Payer: COMMERCIAL

## 2023-02-03 ENCOUNTER — HOSPITAL ENCOUNTER (OUTPATIENT)
Dept: ULTRASOUND IMAGING | Facility: CLINIC | Age: 53
Discharge: HOME | End: 2023-02-03
Attending: PHYSICIAN ASSISTANT | Admitting: PHYSICIAN ASSISTANT
Payer: COMMERCIAL

## 2023-02-03 DIAGNOSIS — R10.32 ABDOMINAL PAIN, LEFT LOWER QUADRANT: ICD-10-CM

## 2023-02-03 DIAGNOSIS — R10.32 ABDOMINAL PAIN, LEFT LOWER QUADRANT: Primary | ICD-10-CM

## 2023-02-03 DIAGNOSIS — R10.13 ABDOMINAL PAIN, EPIGASTRIC: ICD-10-CM

## 2023-02-03 LAB — RADIOLOGIST FLAGS: ABNORMAL

## 2023-02-03 PROCEDURE — 74177 CT ABD & PELVIS W/CONTRAST: CPT

## 2023-02-03 PROCEDURE — 250N000011 HC RX IP 250 OP 636: Performed by: PHYSICIAN ASSISTANT

## 2023-02-03 PROCEDURE — 76700 US EXAM ABDOM COMPLETE: CPT

## 2023-02-03 RX ORDER — IOPAMIDOL 755 MG/ML
80 INJECTION, SOLUTION INTRAVASCULAR ONCE
Status: COMPLETED | OUTPATIENT
Start: 2023-02-03 | End: 2023-02-03

## 2023-02-03 RX ADMIN — IOPAMIDOL 80 ML: 755 INJECTION, SOLUTION INTRAVENOUS at 19:31

## 2023-02-03 NOTE — TELEPHONE ENCOUNTER
Test Results        Who ordered the test:  Vimal River    Type of test: Lab and Ultrasound    Date of test:  2/03/2023    Where was the test performed:  [facility]    What are your questions/concerns?:  Abnormal results    Could we send this information to you in MyChart or would you prefer to receive a phone call?:   Patient would prefer a phone call   Okay to leave a detailed message?: Yes at Cell number on file:    Telephone Information:   Mobile [phone]       Component   Radiologist flags  Rad-Urgent Abnormal (Urgent)   See above report regarding obtain prior CTs.          IMPRESSION:  1.  No evidence of cholelithiasis or cholecystitis.  2.  Presumed small left cortical renal cyst. QC unable to find the prior CT exams for correlation (has patient had a name change?). Suggest comparison with her prior studies.    YEIMI Swanson  [facility]

## 2023-02-03 NOTE — TELEPHONE ENCOUNTER
Called and spoke to pt with us results.   All wnl except for left renal cyst.  Pt c/o worsening pain left lower quadrant, has h/o colitis and h/o partial bowel resection 2/2 endometriosis.  CT ordered, pt will schedule.    Tiki River PA-C

## 2023-02-06 DIAGNOSIS — R11.0 NAUSEA: Primary | ICD-10-CM

## 2023-02-06 DIAGNOSIS — R10.13 ABDOMINAL PAIN, EPIGASTRIC: ICD-10-CM

## 2023-03-21 ENCOUNTER — TELEPHONE (OUTPATIENT)
Dept: FAMILY MEDICINE | Facility: CLINIC | Age: 53
End: 2023-03-21
Payer: COMMERCIAL

## 2023-03-21 NOTE — TELEPHONE ENCOUNTER
Reason for call:  Other   Patient called regarding (reason for call): call back  Additional comments:   Patient has a gastro appt March 31st. Does provider want a visit with patient prior to this Gastro visit?   She is scheduled for labs only at this point.   Are the labs from Gastro provider in chart?  Also, PT is requesting this provider for PCP.     Phone number to reach patient:  Cell number on file:    Telephone Information:   Mobile [phone]       Best Time:  ANY    Can we leave a detailed message on this number?  YES    Travel screening: Not Applicable

## 2023-03-21 NOTE — TELEPHONE ENCOUNTER
I would prefer to see her after she sees MUSHTAQ so that we have their input as well.  We are not on the same computer system so won't see the labs until after the office visit with MUSHTAQ River PA-C

## 2023-03-22 NOTE — TELEPHONE ENCOUNTER
Called and left detailed message on phone number provided, [phone], advised to callback with any questions.

## 2023-03-24 ENCOUNTER — LAB (OUTPATIENT)
Dept: LAB | Facility: CLINIC | Age: 53
End: 2023-03-24
Payer: COMMERCIAL

## 2023-03-24 DIAGNOSIS — E03.9 HYPOTHYROIDISM, UNSPECIFIED TYPE: ICD-10-CM

## 2023-03-24 DIAGNOSIS — R11.0 NAUSEA: ICD-10-CM

## 2023-03-24 DIAGNOSIS — N80.9 ENDOMETRIOSIS: ICD-10-CM

## 2023-03-24 LAB
ERYTHROCYTE [DISTWIDTH] IN BLOOD BY AUTOMATED COUNT: 12.3 % (ref 10–15)
HCT VFR BLD AUTO: 36.6 % (ref 35–47)
HGB BLD-MCNC: 12.4 G/DL (ref 11.7–15.7)
MCH RBC QN AUTO: 31.2 PG (ref 26.5–33)
MCHC RBC AUTO-ENTMCNC: 33.9 G/DL (ref 31.5–36.5)
MCV RBC AUTO: 92 FL (ref 78–100)
PLATELET # BLD AUTO: 319 10E3/UL (ref 150–450)
RBC # BLD AUTO: 3.97 10E6/UL (ref 3.8–5.2)
T4 FREE SERPL-MCNC: 1.47 NG/DL (ref 0.9–1.7)
TSH SERPL DL<=0.005 MIU/L-ACNC: 0.21 UIU/ML (ref 0.3–4.2)
WBC # BLD AUTO: 7.6 10E3/UL (ref 4–11)

## 2023-03-24 PROCEDURE — 36415 COLL VENOUS BLD VENIPUNCTURE: CPT

## 2023-03-24 PROCEDURE — 85027 COMPLETE CBC AUTOMATED: CPT

## 2023-03-24 PROCEDURE — 84439 ASSAY OF FREE THYROXINE: CPT

## 2023-03-24 PROCEDURE — 80053 COMPREHEN METABOLIC PANEL: CPT

## 2023-03-24 PROCEDURE — 84443 ASSAY THYROID STIM HORMONE: CPT

## 2023-03-27 ENCOUNTER — TELEPHONE (OUTPATIENT)
Dept: FAMILY MEDICINE | Facility: CLINIC | Age: 53
End: 2023-03-27

## 2023-03-27 DIAGNOSIS — R11.0 NAUSEA: Primary | ICD-10-CM

## 2023-03-27 NOTE — TELEPHONE ENCOUNTER
3-27-23    Order/Referral Request    Who is requesting: patient    Orders being requested: glucose & liver levels    Reason service is needed/diagnosis: pt having stomach issues.  Pt has an appt w/ Gastro 3-31 & wanted these lab test drawn prior to her gastro appt.      When are orders needed by: soon      Does patient have a preference on a Group/Provider/Facility? [facility]     Does patient have an appointment scheduled?: No    Where to send orders: enter in epic    Could we [facility] this information to you [facility] or would you prefer to receive a phone call?:   Patient would prefer a phone call   Okay to leave a detailed message?: Yes at Cell number on file:    Telephone Information:   Mobile [phone]

## 2023-03-28 LAB
ALBUMIN SERPL BCG-MCNC: 4.5 G/DL (ref 3.5–5.2)
ALP SERPL-CCNC: 120 U/L (ref 35–104)
ALT SERPL W P-5'-P-CCNC: 32 U/L (ref 10–35)
ANION GAP SERPL CALCULATED.3IONS-SCNC: 16 MMOL/L (ref 7–15)
AST SERPL W P-5'-P-CCNC: 29 U/L (ref 10–35)
BILIRUB SERPL-MCNC: 0.3 MG/DL
BUN SERPL-MCNC: 17.5 MG/DL (ref 6–20)
CALCIUM SERPL-MCNC: 9.7 MG/DL (ref 8.6–10)
CHLORIDE SERPL-SCNC: 103 MMOL/L (ref 98–107)
CREAT SERPL-MCNC: 0.81 MG/DL (ref 0.51–0.95)
DEPRECATED HCO3 PLAS-SCNC: 21 MMOL/L (ref 22–29)
GFR SERPL CREATININE-BSD FRML MDRD: 87 ML/MIN/1.73M2
GLUCOSE SERPL-MCNC: 98 MG/DL (ref 70–99)
POTASSIUM SERPL-SCNC: 4.8 MMOL/L (ref 3.4–5.3)
PROT SERPL-MCNC: 7.5 G/DL (ref 6.4–8.3)
SODIUM SERPL-SCNC: 140 MMOL/L (ref 136–145)

## 2023-03-28 NOTE — TELEPHONE ENCOUNTER
Please call patient.    I added those tests on to her labs from last week.  Her glucose and liver function are both normal.    Bia Mckeon NP

## 2023-03-28 NOTE — TELEPHONE ENCOUNTER
Katie is reviewing her lab results and would like clarification on the abnormal labs.  Noting that the Thyroid labs are abnormal and would like more information on the abnormal flags on the comprehensive panel.  Please advise. WILMAR SCHWARZ on 3/28/2023 at 1:45 PM

## 2023-03-28 NOTE — TELEPHONE ENCOUNTER
General Call    Contacts       Type Contact Phone/Fax    03/27/2023 08:20 AM CDT Phone (Incoming) Katie Carson (Self) [phone] (M)    03/28/2023 10:46 AM CDT Phone (Incoming) Katie Carson (Self) [phone] (M)        Reason for Call:  Patient following up on Lab order request    What are your questions or concerns:  Provider talked about re-doing some lab tests (see message below)    Date of last appointment with provider: Labs done on 3.24.23. Patient wants them redone before Friday so she can have results sent to Specialist.    Could we send this information to you in [facility] or would you prefer to receive a phone call?:   Patient would prefer a phone call   Okay to leave a detailed message?: Yes at Cell number on file:    Telephone Information:   Mobile [phone]

## 2023-03-28 NOTE — TELEPHONE ENCOUNTER
Please call patient.    Her TSH looks like it was a little low, but her T4 is normal.  Is she currently on Levothyroxine (Synthroid)?  I do not see this on her med list.  If so, we may want to decrease the dose.    You alkaline phosphatase remains mildly elevated.  This can be discussed at you upcoming GI visit.      CO2 and anion gap are abnormal, but nothing that is concerning in the context of these labs.    If she has additional questions, she can follow up with Tiki when she returns to clinic later this week.     Bia Mckeon, JAKY

## 2023-04-01 ENCOUNTER — HEALTH MAINTENANCE LETTER (OUTPATIENT)
Age: 53
End: 2023-04-01

## 2023-04-06 DIAGNOSIS — E03.9 HYPOTHYROIDISM, UNSPECIFIED TYPE: Primary | ICD-10-CM

## 2023-04-06 RX ORDER — LEVOTHYROXINE SODIUM 112 UG/1
112 TABLET ORAL DAILY
Qty: 90 TABLET | Refills: 3 | Status: SHIPPED | OUTPATIENT
Start: 2023-04-06 | End: 2024-07-09

## 2023-06-11 DIAGNOSIS — R05.8 NOCTURNAL COUGH WITH WHEEZE: ICD-10-CM

## 2023-06-11 DIAGNOSIS — R06.2 NOCTURNAL COUGH WITH WHEEZE: ICD-10-CM

## 2023-06-11 NOTE — TELEPHONE ENCOUNTER
"Routing refill request to provider for review/approval because:  Note saying patient not taking?    Last Written Prescription Date:  10/28/2022  Last Fill Quantity: 18g,  # refills: 1   Last office visit provider:  11/15/2022     Requested Prescriptions   Pending Prescriptions Disp Refills     albuterol (PROAIR HFA/PROVENTIL HFA/VENTOLIN HFA) 108 (90 Base) MCG/ACT inhaler [Pharmacy Med Name: ALBUTEROL HFA (PROVENTIL) INH]  1     Sig: INHALE 2 PUFFS INTO THE LUNGS EVERY 6 HOURS AS NEEDED FOR WHEEZING (OR COUGH)       Asthma Maintenance Inhalers - Anticholinergics Passed - 6/11/2023  1:01 AM        Passed - Patient is age 12 years or older        Passed - Recent (12 mo) or future (30 days) visit within the authorizing provider's specialty     Patient has had an office visit with the authorizing provider or a provider within the authorizing providers department within the previous 12 mos or has a future within next 30 days. See \"Patient Info\" tab in inbasket, or \"Choose Columns\" in Meds & Orders section of the refill encounter.              Passed - Medication is active on med list       Short-Acting Beta Agonist Inhalers Protocol  Passed - 6/11/2023  1:01 AM        Passed - Patient is age 12 or older        Passed - Recent (12 mo) or future (30 days) visit within the authorizing provider's specialty     Patient has had an office visit with the authorizing provider or a provider within the authorizing providers department within the previous 12 mos or has a future within next 30 days. See \"Patient Info\" tab in inbasket, or \"Choose Columns\" in Meds & Orders section of the refill encounter.              Passed - Medication is active on med list             Destiny Holder RN 06/11/23 4:02 PM  "

## 2023-06-12 RX ORDER — ALBUTEROL SULFATE 90 UG/1
2 AEROSOL, METERED RESPIRATORY (INHALATION) EVERY 6 HOURS PRN
Qty: 18 G | Refills: 1 | Status: SHIPPED | OUTPATIENT
Start: 2023-06-12 | End: 2023-08-09

## 2023-08-09 DIAGNOSIS — R06.2 NOCTURNAL COUGH WITH WHEEZE: ICD-10-CM

## 2023-08-09 DIAGNOSIS — R05.8 NOCTURNAL COUGH WITH WHEEZE: ICD-10-CM

## 2023-08-09 RX ORDER — ALBUTEROL SULFATE 90 UG/1
2 AEROSOL, METERED RESPIRATORY (INHALATION) EVERY 6 HOURS PRN
Qty: 18 G | Refills: 1 | Status: SHIPPED | OUTPATIENT
Start: 2023-08-09

## 2023-08-09 NOTE — TELEPHONE ENCOUNTER
"Last Written Prescription Date:  6/12/23  Last Fill Quantity: 18g,  # refills: 1   Last office visit provider:  1/31/23     Requested Prescriptions   Pending Prescriptions Disp Refills    albuterol (PROAIR HFA/PROVENTIL HFA/VENTOLIN HFA) 108 (90 Base) MCG/ACT inhaler 18 g 1     Sig: Inhale 2 puffs into the lungs every 6 hours as needed for wheezing (or cough)       Asthma Maintenance Inhalers - Anticholinergics Passed - 8/9/2023 12:55 PM        Passed - Patient is age 12 years or older        Passed - Recent (12 mo) or future (30 days) visit within the authorizing provider's specialty     Patient has had an office visit with the authorizing provider or a provider within the authorizing providers department within the previous 12 mos or has a future within next 30 days. See \"Patient Info\" tab in inbasket, or \"Choose Columns\" in Meds & Orders section of the refill encounter.              Passed - Medication is active on med list       Short-Acting Beta Agonist Inhalers Protocol  Passed - 8/9/2023 12:55 PM        Passed - Patient is age 12 or older        Passed - Recent (12 mo) or future (30 days) visit within the authorizing provider's specialty     Patient has had an office visit with the authorizing provider or a provider within the authorizing providers department within the previous 12 mos or has a future within next 30 days. See \"Patient Info\" tab in inbasket, or \"Choose Columns\" in Meds & Orders section of the refill encounter.              Passed - Medication is active on med list             Daphney Mahmood RN 08/09/23 12:56 PM  "

## 2023-08-09 NOTE — TELEPHONE ENCOUNTER
Refill request for the following medication(s):  albuterol (PROAIR HFA/PROVENTIL HFA/VENTOLIN HFA) 108 (90 Base) MCG/ACT inhaler     Pharmacy: [facility] [id number] [address]

## 2023-08-17 ENCOUNTER — DOCUMENTATION ONLY (OUTPATIENT)
Dept: FAMILY MEDICINE | Facility: CLINIC | Age: 53
End: 2023-08-17
Payer: COMMERCIAL

## 2023-08-18 ENCOUNTER — DOCUMENTATION ONLY (OUTPATIENT)
Dept: FAMILY MEDICINE | Facility: CLINIC | Age: 53
End: 2023-08-18
Payer: COMMERCIAL

## 2023-08-18 DIAGNOSIS — E03.9 HYPOTHYROIDISM, UNSPECIFIED TYPE: Primary | ICD-10-CM

## 2023-08-18 NOTE — PROGRESS NOTES
Patient is on the lab schedule today, looks like she needs TSH repeat.  There are no orders in the chart would you be able to add them?    Thank you!

## 2023-08-21 ENCOUNTER — LAB (OUTPATIENT)
Dept: LAB | Facility: CLINIC | Age: 53
End: 2023-08-21
Payer: COMMERCIAL

## 2023-08-21 DIAGNOSIS — E03.8 OTHER SPECIFIED HYPOTHYROIDISM: ICD-10-CM

## 2023-08-21 LAB
T4 FREE SERPL-MCNC: 1.38 NG/DL (ref 0.9–1.7)
TSH SERPL DL<=0.005 MIU/L-ACNC: 6.73 UIU/ML (ref 0.3–4.2)

## 2023-08-21 PROCEDURE — 84443 ASSAY THYROID STIM HORMONE: CPT

## 2023-08-21 PROCEDURE — 84439 ASSAY OF FREE THYROXINE: CPT

## 2023-08-21 PROCEDURE — 36415 COLL VENOUS BLD VENIPUNCTURE: CPT

## 2023-08-22 DIAGNOSIS — E03.8 OTHER SPECIFIED HYPOTHYROIDISM: Primary | ICD-10-CM

## 2023-08-22 RX ORDER — LEVOTHYROXINE SODIUM 125 UG/1
125 TABLET ORAL DAILY
Qty: 90 TABLET | Refills: 3 | Status: SHIPPED | OUTPATIENT
Start: 2023-08-22 | End: 2024-07-09

## 2024-06-02 ENCOUNTER — HEALTH MAINTENANCE LETTER (OUTPATIENT)
Age: 54
End: 2024-06-02

## 2024-06-27 ENCOUNTER — OFFICE VISIT (OUTPATIENT)
Dept: FAMILY MEDICINE | Facility: CLINIC | Age: 54
End: 2024-06-27
Payer: COMMERCIAL

## 2024-06-27 VITALS
DIASTOLIC BLOOD PRESSURE: 64 MMHG | HEART RATE: 72 BPM | TEMPERATURE: 97.8 F | SYSTOLIC BLOOD PRESSURE: 100 MMHG | BODY MASS INDEX: 24.14 KG/M2 | OXYGEN SATURATION: 99 % | WEIGHT: 132 LBS | RESPIRATION RATE: 16 BRPM

## 2024-06-27 DIAGNOSIS — E03.9 HYPOTHYROIDISM, UNSPECIFIED TYPE: Primary | ICD-10-CM

## 2024-06-27 PROCEDURE — 84443 ASSAY THYROID STIM HORMONE: CPT | Performed by: STUDENT IN AN ORGANIZED HEALTH CARE EDUCATION/TRAINING PROGRAM

## 2024-06-27 PROCEDURE — 36415 COLL VENOUS BLD VENIPUNCTURE: CPT | Performed by: STUDENT IN AN ORGANIZED HEALTH CARE EDUCATION/TRAINING PROGRAM

## 2024-06-27 PROCEDURE — 84439 ASSAY OF FREE THYROXINE: CPT | Performed by: STUDENT IN AN ORGANIZED HEALTH CARE EDUCATION/TRAINING PROGRAM

## 2024-06-27 PROCEDURE — 99212 OFFICE O/P EST SF 10 MIN: CPT | Performed by: STUDENT IN AN ORGANIZED HEALTH CARE EDUCATION/TRAINING PROGRAM

## 2024-06-27 NOTE — PROGRESS NOTES
ASSESSMENT & PLAN:   Diagnoses and all orders for this visit:  Hypothyroidism, unspecified type  -     PRIMARY CARE FOLLOW-UP SCHEDULING; Future  -     TSH with free T4 reflex; Future    Patient here for lab check of her thyroid with history of hypothyroidism off medication x 9 months. TSH pending. Referral to PCP to follow-up early next week for management.    At the end of the encounter, I discussed results, diagnosis, medications. Discussed red flags for immediate return to clinic/ER, as well as indications for follow up if no improvement. Patient and/or caregiver understood and agreed to plan. Patient was stable for discharge.    There are no Patient Instructions on file for this visit.    No follow-ups on file.    ------------------------------------------------------------------------  SUBJECTIVE  History was obtained from patient.    Patient presents with:  Thyroid Problem: Thyroid gland feels tender and hoarseness voice x 1 week     HPI  Katie Carson is a(n) 54 year old female presenting to urgent care for thyroid lab check. History of hypothyroidism, has been off medication for the past 9 months. She did not refill the medication because she felt like she did not feel any different while taking it. She has tenderness in the anterior neck x 1 week. Developed hoarse voice yesterday. Denies sore throat or pain with swallowing. Endorses dry skin and dry hair x 5 months. Also having brain fog for the past few months. No constipation. No dizziness or lightheadedness.    Review of Systems    Current Outpatient Medications   Medication Sig Dispense Refill    albuterol (PROAIR HFA/PROVENTIL HFA/VENTOLIN HFA) 108 (90 Base) MCG/ACT inhaler Inhale 2 puffs into the lungs every 6 hours as needed for wheezing (or cough) 18 g 1    levothyroxine (SYNTHROID/LEVOTHROID) 112 MCG tablet Take 1 tablet (112 mcg) by mouth daily (Patient not taking: Reported on 6/27/2024) 90 tablet 3    levothyroxine (SYNTHROID/LEVOTHROID) 125  MCG tablet Take 1 tablet (125 mcg) by mouth daily (Patient not taking: Reported on 6/27/2024) 90 tablet 3    omeprazole (PRILOSEC) 20 MG DR capsule Take 1 capsule (20 mg) by mouth daily (Patient not taking: Reported on 6/27/2024) 30 capsule 0    pantoprazole (PROTONIX) 40 MG EC tablet Take 1 tablet (40 mg) by mouth daily (Patient not taking: Reported on 6/27/2024) 30 tablet 0     Problem List:  2017-03: Endometriosis  2016-04: Bladder polyps  2014-01: Hypothyroidism  2014-01: Anxiety and depression    Allergies   Allergen Reactions    Tobramycin Other (See Comments)     Swelling and blistering of skin    Levofloxacin      Other reaction(s): Gastrointestinal    Sulfa Antibiotics Nausea and Vomiting         OBJECTIVE  Vitals:    06/27/24 1729   BP: 100/64   Pulse: 72   Resp: 16   Temp: 97.8  F (36.6  C)   SpO2: 99%   Weight: 59.9 kg (132 lb)     Physical Exam   GENERAL: healthy, alert, no acute distress.   PSYCH: mentation appears normal. Normal affect  HEAD: normocephalic, atraumatic.  EYE: PERRL. EOMs intact. No scleral injection bilaterally.   OROPHARYNX: moist mucous membranes. Posterior oropharynx without erythema or exudate. Uvula midline. Patent airway.  NECK: Left submandibular adenopathy, mild. Tenderness of anterior neck. No palpable thyroid masses.  LUNGS: no increased work of breathing.     No results found for any visits on 06/27/24.

## 2024-06-28 LAB
T4 FREE SERPL-MCNC: 1.32 NG/DL (ref 0.9–1.7)
TSH SERPL DL<=0.005 MIU/L-ACNC: 4.35 UIU/ML (ref 0.3–4.2)

## 2024-07-09 ENCOUNTER — OFFICE VISIT (OUTPATIENT)
Dept: FAMILY MEDICINE | Facility: CLINIC | Age: 54
End: 2024-07-09
Payer: COMMERCIAL

## 2024-07-09 ENCOUNTER — ANCILLARY PROCEDURE (OUTPATIENT)
Dept: GENERAL RADIOLOGY | Facility: CLINIC | Age: 54
End: 2024-07-09
Attending: PHYSICIAN ASSISTANT
Payer: COMMERCIAL

## 2024-07-09 VITALS
OXYGEN SATURATION: 98 % | WEIGHT: 133.13 LBS | HEART RATE: 77 BPM | SYSTOLIC BLOOD PRESSURE: 114 MMHG | BODY MASS INDEX: 24.35 KG/M2 | DIASTOLIC BLOOD PRESSURE: 60 MMHG

## 2024-07-09 DIAGNOSIS — R10.12 LUQ ABDOMINAL PAIN: ICD-10-CM

## 2024-07-09 DIAGNOSIS — E03.8 OTHER SPECIFIED HYPOTHYROIDISM: Primary | ICD-10-CM

## 2024-07-09 DIAGNOSIS — R10.32 ABDOMINAL PAIN, LEFT LOWER QUADRANT: ICD-10-CM

## 2024-07-09 PROCEDURE — 74019 RADEX ABDOMEN 2 VIEWS: CPT | Mod: TC | Performed by: STUDENT IN AN ORGANIZED HEALTH CARE EDUCATION/TRAINING PROGRAM

## 2024-07-09 PROCEDURE — 99214 OFFICE O/P EST MOD 30 MIN: CPT | Performed by: PHYSICIAN ASSISTANT

## 2024-07-09 NOTE — PROGRESS NOTES
Assessment & Plan     Other specified hypothyroidism  Subclinical hypothyroid, discussed treatment vs monitoring, pt elects to monitor notes she didn't feel any different when she was on synthroid  Will continue to monitor  - TSH  - T4, free  - Thyroid peroxidase antibody    LUQ abdominal pain  X 1 week, pt notes intense pain if she pushes in luq otherwise no pain but does have large amount of bloating.    Has h/o partial bowel resection and previous sbo  - XR Abdomen 2 Views     Abdominal pain, left lower quadrant  Began today and associated with nausea    Unclear etiology,  possible partial sbo vs diverticulitis vs other.   Pt will monitor overnight, consider ads tomorrow if symptoms worsening         Referral to Acute and Diagnostic Services    [phone] ([address], [place]  [address]    Transition to Acute [facility] Diagnostic Services Clinic has been discussed with patient, and she agrees with next level of care.   Patient understands that evaluation/treatment at [facility] typically takes significantly longer than in clinic/urgent care (>2 hours).  The [facility] has been contacted by provider/staff to confirm patient acceptance.         Special issues:      None                                    Subjective   Katie is a 54 year old, presenting for the following health issues:  Weight Problem (Gain about 5 pounds/), Abdominal Pain (Upper left ( 1 week) radiates down to lower left just started minutes ago), and Follow Up (Thyroid test result)        7/9/2024     2:54 PM   Additional Questions   Roomed by Gelacio   Accompanied by self     History of Present Illness       Hypothyroidism:     Since last visit, patient describes the following symptoms::  Dry skin, Fatigue and Weight gain    Weight gain::  Less than 5 lbs.    Reason for visit:  Thyroid recheck after being off medication, also for a tender spot on my stomach    She eats 2-3  servings of fruits and vegetables daily.She consumes 0 sweetened beverage(s) daily.She exercises with enough effort to increase her heart rate 30 to 60 minutes per day.  She exercises with enough effort to increase her heart rate 4 days per week. She is missing 7 dose(s) of medications per week.  She is not taking prescribed medications regularly due to remembering to take.       Hypothyroid- was on levothyroxine 125 mcg and stopped about 9 months ago, recent labs showed subclinical hypothyroid.      Luq abdominal pain x 1 week, has pain only with pressure or palpation of the area + bloating.  Today notes she has aching pain in llq as well.  + nausea today, no emesis.  Denies diarrhea or constipation.  Has had hysterectomy with partial bowel resection due to endometriosis.        Review of Systems  Constitutional, HEENT, cardiovascular, pulmonary, gi and gu systems are negative, except as otherwise noted.      Objective    /60 (BP Location: Left arm, Patient Position: Sitting, Cuff Size: Adult Regular)   Pulse 77   Wt 60.4 kg (133 lb 2 oz)   SpO2 98%   BMI 24.35 kg/m    Body mass index is 24.35 kg/m .  Physical Exam   GENERAL: alert and no distress  EYES: Eyes grossly normal to inspection, PERRL and conjunctivae and sclerae normal  RESP: lungs clear to auscultation - no rales, rhonchi or wheezes  CV: regular rate and rhythm, normal S1 S2, , no peripheral edema  ABDOMEN: soft, + ttp luq. no masses and decreased bowl sounds  MS: no gross musculoskeletal defects noted, no edema  SKIN: no suspicious lesions or rashes  NEURO: Normal strength and tone, mentation intact and speech normal  PSYCH: mentation appears normal, affect normal/bright                  Signed Electronically by: Tiki River PA-C

## 2024-07-10 ENCOUNTER — HOSPITAL ENCOUNTER (OUTPATIENT)
Dept: CT IMAGING | Facility: HOSPITAL | Age: 54
Discharge: HOME | End: 2024-07-10
Attending: STUDENT IN AN ORGANIZED HEALTH CARE EDUCATION/TRAINING PROGRAM | Admitting: STUDENT IN AN ORGANIZED HEALTH CARE EDUCATION/TRAINING PROGRAM
Payer: COMMERCIAL

## 2024-07-10 ENCOUNTER — TELEPHONE (OUTPATIENT)
Dept: FAMILY MEDICINE | Facility: CLINIC | Age: 54
End: 2024-07-10
Payer: COMMERCIAL

## 2024-07-10 ENCOUNTER — OFFICE VISIT (OUTPATIENT)
Dept: PEDIATRICS | Facility: CLINIC | Age: 54
End: 2024-07-10
Payer: COMMERCIAL

## 2024-07-10 VITALS
DIASTOLIC BLOOD PRESSURE: 73 MMHG | HEART RATE: 67 BPM | BODY MASS INDEX: 24.34 KG/M2 | TEMPERATURE: 98.1 F | WEIGHT: 133.1 LBS | SYSTOLIC BLOOD PRESSURE: 129 MMHG | RESPIRATION RATE: 16 BRPM | OXYGEN SATURATION: 97 %

## 2024-07-10 DIAGNOSIS — R10.13 EPIGASTRIC PAIN: ICD-10-CM

## 2024-07-10 DIAGNOSIS — Z87.19 HISTORY OF SMALL BOWEL OBSTRUCTION: ICD-10-CM

## 2024-07-10 DIAGNOSIS — R11.0 NAUSEA: ICD-10-CM

## 2024-07-10 DIAGNOSIS — E03.8 SUBCLINICAL HYPOTHYROIDISM: ICD-10-CM

## 2024-07-10 DIAGNOSIS — R10.2 SUPRAPUBIC PAIN: ICD-10-CM

## 2024-07-10 DIAGNOSIS — R14.0 ABDOMINAL BLOATING: Primary | ICD-10-CM

## 2024-07-10 DIAGNOSIS — R14.0 ABDOMINAL BLOATING: ICD-10-CM

## 2024-07-10 PROBLEM — M85.89 OSTEOPENIA OF MULTIPLE SITES: Status: ACTIVE | Noted: 2021-01-26

## 2024-07-10 PROBLEM — Z86.19: Status: ACTIVE | Noted: 2020-10-27

## 2024-07-10 LAB
ALBUMIN SERPL-MCNC: 3.9 G/DL (ref 3.4–5)
ALBUMIN UR-MCNC: NEGATIVE MG/DL
ALP SERPL-CCNC: 107 U/L (ref 40–150)
ALT SERPL W P-5'-P-CCNC: 28 U/L (ref 0–50)
ANION GAP SERPL CALCULATED.3IONS-SCNC: 7 MMOL/L (ref 3–14)
APPEARANCE UR: CLEAR
AST SERPL W P-5'-P-CCNC: 28 U/L (ref 0–45)
BACTERIA #/AREA URNS HPF: ABNORMAL /HPF
BASOPHILS # BLD AUTO: 0 10E3/UL (ref 0–0.2)
BASOPHILS NFR BLD AUTO: 0 %
BILIRUB SERPL-MCNC: 0.5 MG/DL (ref 0.2–1.3)
BILIRUB UR QL STRIP: NEGATIVE
BUN SERPL-MCNC: 16 MG/DL (ref 7–30)
CALCIUM SERPL-MCNC: 9.2 MG/DL (ref 8.5–10.1)
CHLORIDE BLD-SCNC: 106 MMOL/L (ref 94–109)
CO2 SERPL-SCNC: 28 MMOL/L (ref 20–32)
COLOR UR AUTO: YELLOW
CREAT SERPL-MCNC: 1 MG/DL (ref 0.52–1.04)
EGFRCR SERPLBLD CKD-EPI 2021: 67 ML/MIN/1.73M2
EOSINOPHIL # BLD AUTO: 0.1 10E3/UL (ref 0–0.7)
EOSINOPHIL NFR BLD AUTO: 1 %
ERYTHROCYTE [DISTWIDTH] IN BLOOD BY AUTOMATED COUNT: 12.6 % (ref 10–15)
GLUCOSE BLD-MCNC: 106 MG/DL (ref 70–99)
GLUCOSE UR STRIP-MCNC: NEGATIVE MG/DL
HCT VFR BLD AUTO: 35.3 % (ref 35–47)
HGB BLD-MCNC: 12.1 G/DL (ref 11.7–15.7)
HGB UR QL STRIP: NEGATIVE
IMM GRANULOCYTES # BLD: 0 10E3/UL
IMM GRANULOCYTES NFR BLD: 0 %
KETONES UR STRIP-MCNC: NEGATIVE MG/DL
LEUKOCYTE ESTERASE UR QL STRIP: NEGATIVE
LIPASE SERPL-CCNC: 43 U/L (ref 13–60)
LYMPHOCYTES # BLD AUTO: 2 10E3/UL (ref 0.8–5.3)
LYMPHOCYTES NFR BLD AUTO: 29 %
MCH RBC QN AUTO: 31.7 PG (ref 26.5–33)
MCHC RBC AUTO-ENTMCNC: 34.3 G/DL (ref 31.5–36.5)
MCV RBC AUTO: 92 FL (ref 78–100)
MONOCYTES # BLD AUTO: 0.5 10E3/UL (ref 0–1.3)
MONOCYTES NFR BLD AUTO: 8 %
NEUTROPHILS # BLD AUTO: 4.2 10E3/UL (ref 1.6–8.3)
NEUTROPHILS NFR BLD AUTO: 61 %
NITRATE UR QL: NEGATIVE
PH UR STRIP: 6 [PH] (ref 5–8)
PLATELET # BLD AUTO: 301 10E3/UL (ref 150–450)
POTASSIUM BLD-SCNC: 4.4 MMOL/L (ref 3.4–5.3)
PROT SERPL-MCNC: 7.2 G/DL (ref 6.8–8.8)
RBC # BLD AUTO: 3.82 10E6/UL (ref 3.8–5.2)
RBC #/AREA URNS AUTO: ABNORMAL /HPF
SODIUM SERPL-SCNC: 141 MMOL/L (ref 135–145)
SP GR UR STRIP: 1.01 (ref 1–1.03)
SQUAMOUS #/AREA URNS AUTO: ABNORMAL /LPF
T4 FREE SERPL-MCNC: 1.04 NG/DL (ref 0.9–1.7)
TSH SERPL DL<=0.005 MIU/L-ACNC: 6.93 UIU/ML (ref 0.3–4.2)
UROBILINOGEN UR STRIP-ACNC: 0.2 E.U./DL
WBC # BLD AUTO: 6.9 10E3/UL (ref 4–11)
WBC #/AREA URNS AUTO: ABNORMAL /HPF

## 2024-07-10 PROCEDURE — 96374 THER/PROPH/DIAG INJ IV PUSH: CPT | Performed by: STUDENT IN AN ORGANIZED HEALTH CARE EDUCATION/TRAINING PROGRAM

## 2024-07-10 PROCEDURE — 96375 TX/PRO/DX INJ NEW DRUG ADDON: CPT | Performed by: STUDENT IN AN ORGANIZED HEALTH CARE EDUCATION/TRAINING PROGRAM

## 2024-07-10 PROCEDURE — 250N000011 HC RX IP 250 OP 636: Performed by: STUDENT IN AN ORGANIZED HEALTH CARE EDUCATION/TRAINING PROGRAM

## 2024-07-10 PROCEDURE — 36415 COLL VENOUS BLD VENIPUNCTURE: CPT | Performed by: STUDENT IN AN ORGANIZED HEALTH CARE EDUCATION/TRAINING PROGRAM

## 2024-07-10 PROCEDURE — 85025 COMPLETE CBC W/AUTO DIFF WBC: CPT | Performed by: STUDENT IN AN ORGANIZED HEALTH CARE EDUCATION/TRAINING PROGRAM

## 2024-07-10 PROCEDURE — 99215 OFFICE O/P EST HI 40 MIN: CPT | Mod: 25 | Performed by: STUDENT IN AN ORGANIZED HEALTH CARE EDUCATION/TRAINING PROGRAM

## 2024-07-10 PROCEDURE — 84443 ASSAY THYROID STIM HORMONE: CPT | Performed by: STUDENT IN AN ORGANIZED HEALTH CARE EDUCATION/TRAINING PROGRAM

## 2024-07-10 PROCEDURE — 74177 CT ABD & PELVIS W/CONTRAST: CPT

## 2024-07-10 PROCEDURE — 81001 URINALYSIS AUTO W/SCOPE: CPT | Performed by: STUDENT IN AN ORGANIZED HEALTH CARE EDUCATION/TRAINING PROGRAM

## 2024-07-10 PROCEDURE — 84439 ASSAY OF FREE THYROXINE: CPT | Performed by: STUDENT IN AN ORGANIZED HEALTH CARE EDUCATION/TRAINING PROGRAM

## 2024-07-10 PROCEDURE — 250N000009 HC RX 250: Performed by: STUDENT IN AN ORGANIZED HEALTH CARE EDUCATION/TRAINING PROGRAM

## 2024-07-10 PROCEDURE — 80053 COMPREHEN METABOLIC PANEL: CPT | Performed by: STUDENT IN AN ORGANIZED HEALTH CARE EDUCATION/TRAINING PROGRAM

## 2024-07-10 PROCEDURE — 83690 ASSAY OF LIPASE: CPT | Performed by: STUDENT IN AN ORGANIZED HEALTH CARE EDUCATION/TRAINING PROGRAM

## 2024-07-10 RX ORDER — ONDANSETRON 2 MG/ML
4 INJECTION INTRAMUSCULAR; INTRAVENOUS
Status: ACTIVE | OUTPATIENT
Start: 2024-07-10 | End: 2024-07-11

## 2024-07-10 RX ORDER — IOPAMIDOL 755 MG/ML
65 INJECTION, SOLUTION INTRAVASCULAR ONCE
Status: COMPLETED | OUTPATIENT
Start: 2024-07-10 | End: 2024-07-10

## 2024-07-10 RX ORDER — ONDANSETRON 4 MG/1
4-8 TABLET, ORALLY DISINTEGRATING ORAL EVERY 8 HOURS PRN
Qty: 15 TABLET | Refills: 0 | Status: SHIPPED | OUTPATIENT
Start: 2024-07-10

## 2024-07-10 RX ORDER — KETOROLAC TROMETHAMINE 30 MG/ML
15 INJECTION, SOLUTION INTRAMUSCULAR; INTRAVENOUS ONCE
Status: COMPLETED | OUTPATIENT
Start: 2024-07-10 | End: 2024-07-10

## 2024-07-10 RX ORDER — NAPROXEN 500 MG/1
500 TABLET ORAL 2 TIMES DAILY WITH MEALS
Qty: 14 TABLET | Refills: 0 | Status: SHIPPED | OUTPATIENT
Start: 2024-07-10

## 2024-07-10 RX ADMIN — ONDANSETRON 4 MG: 2 INJECTION INTRAMUSCULAR; INTRAVENOUS at 11:50

## 2024-07-10 RX ADMIN — KETOROLAC TROMETHAMINE 15 MG: 30 INJECTION, SOLUTION INTRAMUSCULAR; INTRAVENOUS at 11:50

## 2024-07-10 RX ADMIN — IOPAMIDOL 65 ML: 755 INJECTION, SOLUTION INTRAVENOUS at 12:22

## 2024-07-10 RX ADMIN — SODIUM CHLORIDE 66 ML: 9 INJECTION, SOLUTION INTRAVENOUS at 12:30

## 2024-07-10 ASSESSMENT — PAIN SCALES - GENERAL: PAINLEVEL: SEVERE PAIN (6)

## 2024-07-10 NOTE — PROGRESS NOTES
[facility] Visit    Assessment & Plan      Diagnosis Comments   1. Abdominal bloating  ketorolac (TORADOL) injection 15 mg, CBC with platelets differential, Comprehensive metabolic panel, Lipase, TSH with free T4 reflex, naproxen (NAPROSYN) 500 MG tablet, ondansetron (ZOFRAN ODT) 4 MG ODT tab, T4 free       2. Suprapubic pain  UA with Microscopic reflex to Culture, UA Microscopic with Reflex to Culture, ketorolac (TORADOL) injection 15 mg, CBC with platelets differential, Comprehensive metabolic panel, Lipase       3. Epigastric pain  ketorolac (TORADOL) injection 15 mg, CBC with platelets differential, Comprehensive metabolic panel, Lipase, TSH with free T4 reflex, T4 free       4. Nausea  ondansetron (ZOFRAN) injection 4 mg       5. History of small bowel obstruction        6. Subclinical hypothyroidism  TSH with free T4 reflex, T4 free         On exam, patient appears to be in some discomfort  Her abdomen is consistent with generalized distention, tenderness with palpation of the epigastric region, periumbilical region, RLQ, suprapubic region and LLQ.  She has both voluntary and involuntary guarding.  No rebound noted.  Hypoactive bowel sounds  No CVA tenderness bilaterally  Will plan for UA, baseline blood work and CT abdomen/pelvis  Will give Toradol and Zofran for symptom management      3:20 PM:  Reviewed CT abdomen with patient.  CT abdomen is quite reassuring.  Blood work is also reassuring.  Pain may be musculoskeletal.  I patient has had significant improvement with Toradol.  Will have her do a clear liquid diet x 1 week to allow for gut rest, do scheduled Naprosyn x 1 week to allow for any inflammation to heal and Zofran as needed.  Advised patient to eat smaller meals frequently throughout the day.  Monitor for fevers.  At the time of discharge, patient's TSH was pending.  It has not resulted.  TSH is elevated at 6.93 with normal T4.  Still consistent with subclinical  "hypothyroidism.  Can be managed outpatient.  If patient has any fevers, chills, persistent nausea vomiting-reach out to to PCP for ongoing management.      40 minutes were spent doing chart review, history and exam, documentation and further activities per the note.      No follow-ups on file.    Justo Wilson is a 54 year old, presenting for the following health issues:  Abdominal Pain (Epigastric region & Lower Pelvic Region )        7/9/2024     2:54 PM   Additional Questions   Roomed by Gelacio   Accompanied by self     HPI     Patient is a pleasant 54-year-old female with PMH of hypothyroidism, endometriosis s/p total hysterectomy and bilateral oophorectomy, s/p partial colectomy, history of anxiety and depression who presents today for abdominal pain    For the last few weeks, patient has been experiencing significant bloating.  Onset was somewhat sudden but has been persistent.  Abdomen feels tight and \"really hard\" at the end of the day.  Then, on Saturday this weekend, she started to experience worsening bloating and tenderness near the umbilicus.  She had an area of reproducible tenderness in the right upper quadrant region.  Started to experience intermittent cramps, nausea and the pain was starting to travel to the suprapubic area.  The pain in the suprapubic region was sharp and stabbing.  At baseline, she has no abdominal pain.  Did have an SBO a few years after her initial bowel resection.  Usually goes to the bathroom 3 times a day but has been going to the bathroom less frequently.  Her last bowel movement was last night.  Has been having chills but no fevers.  No one sick around the house.  No changes to her meds.  Has some increased urinary frequency but no dysuria, flank pain.  No hematuria.  Patient does not smoke or drink.  No significant family history of cancers.  No marijuana use.  Still has her gallbladder and her appendix.  Has not trialed any medications for " this.        Abdominal/Flank Pain  Onset/Duration: Weekend - Epigastric & Yesterday - Pelvic  Description:   Character: Stabbing and Cramping  Location: epigastric region dio-umbilical region  Radiation: None  Intensity: 8/10  Progression of Symptoms:  same  Accompanying Signs & Symptoms:  Fever/chills: YES- Chills  Gas/Bloating: YES  Nausea: YES  Vomitting: no   Diarrhea: no   Constipation:YES- No constipation, but has not gone to the bathroom since yesterday  Dysuria: no            Hematuria: no            Frequency: YES- More often           Incontinence of urine: no   History:            Last bowel movement: yesterday - 530/6pm  Trauma: YES  Previous similar pain: YES- cramping yes, but not pain up in the stomach   Previous tests done: x-ray - yesterday, showing two large pockets in the stomach           Previous Abdominal surgery: YES- Multiple surgeries; bowel reconstruction  Precipitating factors:   Does the pain change with:     Food: YES- Not with pain, but nausea     Bowel Movement: no     Urination: no              Other factors: no   Therapies tried and outcome:  None    When food last eaten: 8am this morning        Review of Systems  As per HPI       Objective    /73 (BP Location: Right arm, Patient Position: Sitting, Cuff Size: Adult Regular)   Pulse 67   Temp 98.1  F (36.7  C) (Oral)   Resp 16   Wt 60.4 kg (133 lb 1.6 oz)   SpO2 97%   BMI 24.34 kg/m    Body mass index is 24.34 kg/m .  Physical Exam   GENERAL: alert and no distress  NECK: no adenopathy, no asymmetry, masses, or scars  RESP: lungs clear to auscultation - no rales, rhonchi or wheezes  CV: regular rate and rhythm, no murmur, click or rub, no peripheral edema  ABDOMEN: soft, (+) generalized abdominal pain ( most  notable in the epigastric region, dio-umbilical region)and abdominal bloating, (+) voluntary and involuntary guarding, no CVA tenderness bilaterally, no hepatosplenomegaly, no masses and bowel sounds normal  MS: no  gross musculoskeletal defects noted, no edema        Signed Electronically by: Lilly Smith DO      Results for orders placed or performed during the hospital encounter of 07/10/24   CT Abdomen Pelvis w Contrast     Status: None    Narrative    EXAM: CT ABDOMEN PELVIS W CONTRAST  LOCATION: [facility]  DATE: 7/10/2024    INDICATION:  Suprapubic pain, Epigastric pain, Abdominal bloating, Nausea, History of small bowel obstruction  COMPARISON: CT abdomen pelvis 2/3/2023  TECHNIQUE: CT scan of the abdomen and pelvis was performed following injection of IV contrast. Multiplanar reformats were obtained. Dose reduction techniques were used.  CONTRAST: 65 ml Isovue 370    FINDINGS:   LOWER CHEST: Normal.    HEPATOBILIARY: Normal liver contours. No worrisome liver lesions. Normal gallbladder.    PANCREAS: Normal.    SPLEEN: Normal.    ADRENAL GLANDS: Normal.    KIDNEYS/BLADDER: Kidneys enhance symmetrically. No urolithiasis or hydronephrosis. Urinary bladder is unremarkable.    BOWEL: Partial colectomy with colocolonic anastomosis in the pelvis. No bowel obstruction. Normal appendix. No findings of diverticulitis. No free fluid or free air.    LYMPH NODES: No lymphadenopathy.    VASCULATURE: Abdominal aorta is nonaneurysmal.    PELVIC ORGANS: Hysterectomy.    MUSCULOSKELETAL: No worrisome bone lesions.      Impression    IMPRESSION:   No acute abnormality in the abdomen or pelvis.   Results for orders placed or performed in visit on 07/10/24   UA with Microscopic reflex to Culture     Status: Normal    Specimen: Urine, Clean Catch   Result Value Ref Range    Color Urine Yellow Colorless, Straw, Light Yellow, Yellow    Appearance Urine Clear Clear    Glucose Urine Negative Negative mg/dL    Bilirubin Urine Negative Negative    Ketones Urine Negative Negative mg/dL    Specific Gravity Urine 1.010 1.005 - 1.030    Blood Urine Negative Negative    pH Urine 6.0 5.0 - 8.0    Protein Albumin Urine  Negative Negative mg/dL    Urobilinogen Urine 0.2 0.2, 1.0 E.U./dL    Nitrite Urine Negative Negative    Leukocyte Esterase Urine Negative Negative   UA Microscopic with Reflex to Culture     Status: Abnormal   Result Value Ref Range    Bacteria Urine None Seen None Seen /HPF    RBC Urine None Seen 0-2 /HPF /HPF    WBC Urine None Seen 0-5 /HPF /HPF    Squamous Epithelials Urine Few (A) None Seen /LPF    Narrative    Urine Culture not indicated   Lipase     Status: Normal   Result Value Ref Range    Lipase 43 13 - 60 U/L   Comprehensive metabolic panel     Status: Abnormal   Result Value Ref Range    Sodium 141 135 - 145 mmol/L    Potassium 4.4 3.4 - 5.3 mmol/L    Chloride 106 94 - 109 mmol/L    Carbon Dioxide (CO2) 28 20 - 32 mmol/L    Anion Gap 7 3 - 14 mmol/L    Urea Nitrogen 16 7 - 30 mg/dL    Creatinine 1.00 0.52 - 1.04 mg/dL    GFR Estimate 67 >60 mL/min/1.73m2    Calcium 9.2 8.5 - 10.1 mg/dL    Glucose 106 (H) 70 - 99 mg/dL    Alkaline Phosphatase 107 40 - 150 U/L    AST 28 0 - 45 U/L    ALT 28 0 - 50 U/L    Protein Total 7.2 6.8 - 8.8 g/dL    Albumin 3.9 3.4 - 5.0 g/dL    Bilirubin Total 0.5 0.2 - 1.3 mg/dL   CBC with platelets and differential     Status: None   Result Value Ref Range    WBC Count 6.9 4.0 - 11.0 10e3/uL    RBC Count 3.82 3.80 - 5.20 10e6/uL    Hemoglobin 12.1 11.7 - 15.7 g/dL    Hematocrit 35.3 35.0 - 47.0 %    MCV 92 78 - 100 fL    MCH 31.7 26.5 - 33.0 pg    MCHC 34.3 31.5 - 36.5 g/dL    RDW 12.6 10.0 - 15.0 %    Platelet Count 301 150 - 450 10e3/uL    % Neutrophils 61 %    % Lymphocytes 29 %    % Monocytes 8 %    % Eosinophils 1 %    % Basophils 0 %    % Immature Granulocytes 0 %    Absolute Neutrophils 4.2 1.6 - 8.3 10e3/uL    Absolute Lymphocytes 2.0 0.8 - 5.3 10e3/uL    Absolute Monocytes 0.5 0.0 - 1.3 10e3/uL    Absolute Eosinophils 0.1 0.0 - 0.7 10e3/uL    Absolute Basophils 0.0 0.0 - 0.2 10e3/uL    Absolute Immature Granulocytes 0.0 <=0.4 10e3/uL   TSH with free T4 reflex     Status:  Abnormal   Result Value Ref Range    TSH 6.93 (H) 0.30 - 4.20 uIU/mL   T4 free     Status: Normal   Result Value Ref Range    Free T4 1.04 0.90 - 1.70 ng/dL   CBC with platelets differential     Status: None    Narrative    The following orders were created for panel order CBC with platelets differential.  Procedure                               Abnormality         Status                     ---------                               -----------         ------                     CBC with platelets and d...[[id number]]                      Final result                 Please view results for these tests on the individual orders.

## 2024-07-10 NOTE — TELEPHONE ENCOUNTER
Called and spoke with pt to follow up on abdominal pain.  Reports pain is unchanged in severity today.  Does note she is now unable to eat as it worsens her pain and that is new today.   Will send to jg morillo for further work up.     Tiki River PA-C

## 2024-07-11 ENCOUNTER — MYC MEDICAL ADVICE (OUTPATIENT)
Dept: FAMILY MEDICINE | Facility: CLINIC | Age: 54
End: 2024-07-11
Payer: COMMERCIAL

## 2024-07-11 NOTE — TELEPHONE ENCOUNTER
See MyChart from Patient needing PCP reponse.    Patient had CT and XR of abdomen yesterday, all neg, has treatment plan in place. Saw another provider yesterday        YEIMI Swanson  [facility] [place]

## 2024-10-07 ENCOUNTER — TELEPHONE (OUTPATIENT)
Dept: DERMATOLOGY | Facility: CLINIC | Age: 54
End: 2024-10-07
Payer: COMMERCIAL

## 2024-10-07 NOTE — TELEPHONE ENCOUNTER
"[facility]    Appointment request form - \"Dermatology\"     May a detailed message be left on voicemail: no     Reason for Call: Appointment Intake    Referring Provider Name: self   Diagnosis and/or Symptoms: eczema in my ear has been getting worse    Action Taken: Other: called patient back [facility] and sent mychart    Travel Screening: Not Applicable     Date of Service:                                                                      "

## 2024-11-20 ENCOUNTER — HOSPITAL ENCOUNTER (OUTPATIENT)
Dept: MAMMOGRAPHY | Facility: CLINIC | Age: 54
Discharge: HOME | End: 2024-11-20
Attending: PHYSICIAN ASSISTANT
Payer: COMMERCIAL

## 2024-11-20 DIAGNOSIS — Z12.31 VISIT FOR SCREENING MAMMOGRAM: ICD-10-CM

## 2024-11-20 PROCEDURE — 77063 BREAST TOMOSYNTHESIS BI: CPT

## 2024-11-20 PROCEDURE — 77067 SCR MAMMO BI INCL CAD: CPT

## 2024-11-27 ENCOUNTER — TELEPHONE (OUTPATIENT)
Dept: FAMILY MEDICINE | Facility: CLINIC | Age: 54
End: 2024-11-27
Payer: COMMERCIAL

## 2024-11-27 NOTE — TELEPHONE ENCOUNTER
Medication Question or Refill    Patient has question about future fasting lab on 12/4 and Dexa scan on 1/7/2025.    Do you have any questions or concerns?  Yes: Patient wanting to know if she should hold her omeprazole and for how long before have lab and procedure done.    What medication are you calling about (include dose and sig)?: omeprazole (PRILOSEC) 20 MG DR capsule   TAKE 1 CAPSULE BY MOUTH EVERY DAY    Who prescribed the medication?: Tiki COLÓN    Do you need a refill? No    Do you have any questions or concerns?  Yes: Patient wanting to know if she should hold her omeprazole and for how long before have lab and procedure done.    Patient is okay with [employer] message.

## 2024-12-04 ENCOUNTER — LAB (OUTPATIENT)
Dept: LAB | Facility: CLINIC | Age: 54
End: 2024-12-04
Attending: PHYSICIAN ASSISTANT
Payer: COMMERCIAL

## 2024-12-04 DIAGNOSIS — Z13.220 LIPID SCREENING: ICD-10-CM

## 2024-12-04 DIAGNOSIS — E03.8 OTHER SPECIFIED HYPOTHYROIDISM: ICD-10-CM

## 2024-12-04 DIAGNOSIS — R14.0 ABDOMINAL BLOATING: ICD-10-CM

## 2024-12-04 LAB
CHOLEST SERPL-MCNC: 207 MG/DL
EST. AVERAGE GLUCOSE BLD GHB EST-MCNC: 117 MG/DL
FASTING STATUS PATIENT QL REPORTED: YES
HBA1C MFR BLD: 5.7 % (ref 0–5.6)
HDLC SERPL-MCNC: 64 MG/DL
LDLC SERPL CALC-MCNC: 126 MG/DL
NONHDLC SERPL-MCNC: 143 MG/DL
T4 FREE SERPL-MCNC: 1.02 NG/DL (ref 0.9–1.7)
TRIGL SERPL-MCNC: 87 MG/DL
TSH SERPL DL<=0.005 MIU/L-ACNC: 9.5 UIU/ML (ref 0.3–4.2)

## 2024-12-04 PROCEDURE — 84439 ASSAY OF FREE THYROXINE: CPT

## 2024-12-04 PROCEDURE — 83036 HEMOGLOBIN GLYCOSYLATED A1C: CPT

## 2024-12-04 PROCEDURE — 80061 LIPID PANEL: CPT

## 2024-12-04 PROCEDURE — 36415 COLL VENOUS BLD VENIPUNCTURE: CPT

## 2024-12-04 PROCEDURE — 84443 ASSAY THYROID STIM HORMONE: CPT

## 2024-12-04 PROCEDURE — 86376 MICROSOMAL ANTIBODY EACH: CPT

## 2024-12-05 LAB — THYROPEROXIDASE AB SERPL-ACNC: 28 IU/ML

## 2025-01-07 ENCOUNTER — ANCILLARY PROCEDURE (OUTPATIENT)
Dept: BONE DENSITY | Facility: CLINIC | Age: 55
End: 2025-01-07
Attending: PHYSICIAN ASSISTANT
Payer: COMMERCIAL

## 2025-01-07 DIAGNOSIS — M85.80 OSTEOPENIA, UNSPECIFIED LOCATION: ICD-10-CM

## 2025-01-07 PROCEDURE — 77080 DXA BONE DENSITY AXIAL: CPT | Mod: TC | Performed by: PHYSICIAN ASSISTANT

## 2025-01-07 PROCEDURE — 77091 TBS TECHL CALCULATION ONLY: CPT | Performed by: PHYSICIAN ASSISTANT

## 2025-08-09 ENCOUNTER — OFFICE VISIT (OUTPATIENT)
Dept: URGENT CARE | Facility: URGENT CARE | Age: 55
End: 2025-08-09
Payer: COMMERCIAL

## 2025-08-09 ENCOUNTER — ANCILLARY PROCEDURE (OUTPATIENT)
Dept: GENERAL RADIOLOGY | Facility: CLINIC | Age: 55
End: 2025-08-09
Attending: PHYSICIAN ASSISTANT
Payer: COMMERCIAL

## 2025-08-09 VITALS
TEMPERATURE: 97.7 F | RESPIRATION RATE: 16 BRPM | HEART RATE: 68 BPM | DIASTOLIC BLOOD PRESSURE: 74 MMHG | WEIGHT: 133 LBS | BODY MASS INDEX: 24.33 KG/M2 | OXYGEN SATURATION: 98 % | SYSTOLIC BLOOD PRESSURE: 128 MMHG

## 2025-08-09 DIAGNOSIS — M79.672 PAIN OF LEFT HEEL: Primary | ICD-10-CM

## 2025-08-09 DIAGNOSIS — M79.672 PAIN OF LEFT HEEL: ICD-10-CM

## 2025-08-09 PROCEDURE — 3074F SYST BP LT 130 MM HG: CPT | Performed by: PHYSICIAN ASSISTANT

## 2025-08-09 PROCEDURE — 3078F DIAST BP <80 MM HG: CPT | Performed by: PHYSICIAN ASSISTANT

## 2025-08-09 PROCEDURE — 73630 X-RAY EXAM OF FOOT: CPT | Mod: TC | Performed by: RADIOLOGY

## 2025-08-09 PROCEDURE — 99214 OFFICE O/P EST MOD 30 MIN: CPT | Performed by: PHYSICIAN ASSISTANT
